# Patient Record
Sex: FEMALE | Race: WHITE | Employment: OTHER | URBAN - METROPOLITAN AREA
[De-identification: names, ages, dates, MRNs, and addresses within clinical notes are randomized per-mention and may not be internally consistent; named-entity substitution may affect disease eponyms.]

---

## 2020-10-26 ENCOUNTER — APPOINTMENT (OUTPATIENT)
Dept: CT IMAGING | Age: 65
DRG: 389 | End: 2020-10-26
Attending: EMERGENCY MEDICINE
Payer: MEDICARE

## 2020-10-26 ENCOUNTER — HOSPITAL ENCOUNTER (INPATIENT)
Age: 65
LOS: 2 days | Discharge: HOME | DRG: 389 | End: 2020-10-29
Attending: EMERGENCY MEDICINE | Admitting: FAMILY MEDICINE
Payer: MEDICARE

## 2020-10-26 DIAGNOSIS — R93.5 ABNORMAL CT OF THE ABDOMEN: ICD-10-CM

## 2020-10-26 DIAGNOSIS — I10 ESSENTIAL HYPERTENSION: ICD-10-CM

## 2020-10-26 DIAGNOSIS — K56.609 SBO (SMALL BOWEL OBSTRUCTION) (HCC): Primary | ICD-10-CM

## 2020-10-26 DIAGNOSIS — K91.89 AFFERENT LOOP SYNDROME: ICD-10-CM

## 2020-10-26 DIAGNOSIS — Z98.84 HISTORY OF ROUX-EN-Y GASTRIC BYPASS: ICD-10-CM

## 2020-10-26 DIAGNOSIS — E86.0 DEHYDRATION: ICD-10-CM

## 2020-10-26 LAB
ALBUMIN SERPL-MCNC: 3.7 G/DL (ref 3.2–4.6)
ALBUMIN/GLOB SERPL: 0.9 {RATIO} (ref 1.2–3.5)
ALP SERPL-CCNC: 232 U/L (ref 50–136)
ALT SERPL-CCNC: 27 U/L (ref 12–65)
ANION GAP SERPL CALC-SCNC: 10 MMOL/L (ref 7–16)
AST SERPL-CCNC: 72 U/L (ref 15–37)
BASOPHILS # BLD: 0.1 K/UL (ref 0–0.2)
BASOPHILS NFR BLD: 0 % (ref 0–2)
BILIRUB SERPL-MCNC: 1.3 MG/DL (ref 0.2–1.1)
BUN SERPL-MCNC: 22 MG/DL (ref 8–23)
CALCIUM SERPL-MCNC: 9.8 MG/DL (ref 8.3–10.4)
CHLORIDE SERPL-SCNC: 107 MMOL/L (ref 98–107)
CO2 SERPL-SCNC: 20 MMOL/L (ref 21–32)
CREAT SERPL-MCNC: 1.38 MG/DL (ref 0.6–1)
DIFFERENTIAL METHOD BLD: ABNORMAL
EOSINOPHIL # BLD: 0 K/UL (ref 0–0.8)
EOSINOPHIL NFR BLD: 0 % (ref 0.5–7.8)
ERYTHROCYTE [DISTWIDTH] IN BLOOD BY AUTOMATED COUNT: 13.3 % (ref 11.9–14.6)
GLOBULIN SER CALC-MCNC: 4.2 G/DL (ref 2.3–3.5)
GLUCOSE SERPL-MCNC: 114 MG/DL (ref 65–100)
HCT VFR BLD AUTO: 45.9 % (ref 35.8–46.3)
HGB BLD-MCNC: 15.9 G/DL (ref 11.7–15.4)
IMM GRANULOCYTES # BLD AUTO: 0.1 K/UL (ref 0–0.5)
IMM GRANULOCYTES NFR BLD AUTO: 1 % (ref 0–5)
LYMPHOCYTES # BLD: 2.6 K/UL (ref 0.5–4.6)
LYMPHOCYTES NFR BLD: 14 % (ref 13–44)
MCH RBC QN AUTO: 28.9 PG (ref 26.1–32.9)
MCHC RBC AUTO-ENTMCNC: 34.6 G/DL (ref 31.4–35)
MCV RBC AUTO: 83.5 FL (ref 79.6–97.8)
MONOCYTES # BLD: 1.3 K/UL (ref 0.1–1.3)
MONOCYTES NFR BLD: 7 % (ref 4–12)
NEUTS SEG # BLD: 14.2 K/UL (ref 1.7–8.2)
NEUTS SEG NFR BLD: 78 % (ref 43–78)
NRBC # BLD: 0 K/UL (ref 0–0.2)
PLATELET # BLD AUTO: 328 K/UL (ref 150–450)
PMV BLD AUTO: 8.7 FL (ref 9.4–12.3)
POTASSIUM SERPL-SCNC: 5.5 MMOL/L (ref 3.5–5.1)
PROT SERPL-MCNC: 7.9 G/DL (ref 6.3–8.2)
RBC # BLD AUTO: 5.5 M/UL (ref 4.05–5.2)
SODIUM SERPL-SCNC: 137 MMOL/L (ref 136–145)
WBC # BLD AUTO: 18.3 K/UL (ref 4.3–11.1)

## 2020-10-26 PROCEDURE — 74177 CT ABD & PELVIS W/CONTRAST: CPT

## 2020-10-26 PROCEDURE — 85025 COMPLETE CBC W/AUTO DIFF WBC: CPT

## 2020-10-26 PROCEDURE — 96374 THER/PROPH/DIAG INJ IV PUSH: CPT

## 2020-10-26 PROCEDURE — 99285 EMERGENCY DEPT VISIT HI MDM: CPT

## 2020-10-26 PROCEDURE — 74011250637 HC RX REV CODE- 250/637: Performed by: EMERGENCY MEDICINE

## 2020-10-26 PROCEDURE — 80053 COMPREHEN METABOLIC PANEL: CPT

## 2020-10-26 PROCEDURE — 81003 URINALYSIS AUTO W/O SCOPE: CPT

## 2020-10-26 PROCEDURE — 96361 HYDRATE IV INFUSION ADD-ON: CPT

## 2020-10-26 PROCEDURE — 74011250636 HC RX REV CODE- 250/636: Performed by: EMERGENCY MEDICINE

## 2020-10-26 PROCEDURE — 96375 TX/PRO/DX INJ NEW DRUG ADDON: CPT

## 2020-10-26 PROCEDURE — 74011000636 HC RX REV CODE- 636: Performed by: EMERGENCY MEDICINE

## 2020-10-26 RX ORDER — SODIUM CHLORIDE 0.9 % (FLUSH) 0.9 %
10 SYRINGE (ML) INJECTION
Status: COMPLETED | OUTPATIENT
Start: 2020-10-26 | End: 2020-10-27

## 2020-10-26 RX ORDER — HYDROMORPHONE HYDROCHLORIDE 1 MG/ML
1 INJECTION, SOLUTION INTRAMUSCULAR; INTRAVENOUS; SUBCUTANEOUS
Status: COMPLETED | OUTPATIENT
Start: 2020-10-26 | End: 2020-10-26

## 2020-10-26 RX ORDER — DIPHENOXYLATE HYDROCHLORIDE AND ATROPINE SULFATE 2.5; .025 MG/1; MG/1
2 TABLET ORAL
Status: COMPLETED | OUTPATIENT
Start: 2020-10-26 | End: 2020-10-26

## 2020-10-26 RX ORDER — ONDANSETRON 2 MG/ML
4 INJECTION INTRAMUSCULAR; INTRAVENOUS
Status: COMPLETED | OUTPATIENT
Start: 2020-10-26 | End: 2020-10-26

## 2020-10-26 RX ORDER — HYOSCYAMINE SULFATE 0.12 MG/1
0.25 TABLET SUBLINGUAL
Status: COMPLETED | OUTPATIENT
Start: 2020-10-26 | End: 2020-10-26

## 2020-10-26 RX ADMIN — DIATRIZOATE MEGLUMINE AND DIATRIZOATE SODIUM 15 ML: 660; 100 LIQUID ORAL; RECTAL at 23:13

## 2020-10-26 RX ADMIN — DIPHENOXYLATE HYDROCHLORIDE AND ATROPINE SULFATE 2 TABLET: 2.5; .025 TABLET ORAL at 21:59

## 2020-10-26 RX ADMIN — HYDROMORPHONE HYDROCHLORIDE 1 MG: 1 INJECTION, SOLUTION INTRAMUSCULAR; INTRAVENOUS; SUBCUTANEOUS at 21:59

## 2020-10-26 RX ADMIN — HYOSCYAMINE SULFATE 0.25 MG: 0.12 TABLET ORAL; SUBLINGUAL at 21:59

## 2020-10-26 RX ADMIN — ONDANSETRON 4 MG: 2 INJECTION INTRAMUSCULAR; INTRAVENOUS at 21:53

## 2020-10-26 RX ADMIN — SODIUM CHLORIDE 1000 ML: 900 INJECTION, SOLUTION INTRAVENOUS at 21:52

## 2020-10-26 NOTE — ED TRIAGE NOTES
Patient arrives via EMS from home for complaint of nausea, vomiting, diarrhea, and fatigue. Patient states symptoms began today.

## 2020-10-27 ENCOUNTER — APPOINTMENT (OUTPATIENT)
Dept: GENERAL RADIOLOGY | Age: 65
DRG: 389 | End: 2020-10-27
Attending: SURGERY
Payer: MEDICARE

## 2020-10-27 PROBLEM — F41.9 ANXIETY AND DEPRESSION: Status: ACTIVE | Noted: 2020-10-27

## 2020-10-27 PROBLEM — Z98.84 HISTORY OF GASTRIC BYPASS: Status: ACTIVE | Noted: 2020-10-27

## 2020-10-27 PROBLEM — K56.609 SBO (SMALL BOWEL OBSTRUCTION) (HCC): Status: ACTIVE | Noted: 2020-10-27

## 2020-10-27 PROBLEM — I50.32 CHRONIC DIASTOLIC CONGESTIVE HEART FAILURE (HCC): Status: ACTIVE | Noted: 2017-03-18

## 2020-10-27 PROBLEM — F32.A ANXIETY AND DEPRESSION: Status: ACTIVE | Noted: 2020-10-27

## 2020-10-27 LAB
ALBUMIN SERPL-MCNC: 2.9 G/DL (ref 3.2–4.6)
ALBUMIN/GLOB SERPL: 0.9 {RATIO} (ref 1.2–3.5)
ALP SERPL-CCNC: 171 U/L (ref 50–136)
ALT SERPL-CCNC: 25 U/L (ref 12–65)
ANION GAP SERPL CALC-SCNC: 9 MMOL/L (ref 7–16)
AST SERPL-CCNC: 38 U/L (ref 15–37)
BASOPHILS # BLD: 0.1 K/UL (ref 0–0.2)
BASOPHILS NFR BLD: 1 % (ref 0–2)
BILIRUB SERPL-MCNC: 0.5 MG/DL (ref 0.2–1.1)
BUN SERPL-MCNC: 21 MG/DL (ref 8–23)
CALCIUM SERPL-MCNC: 8.5 MG/DL (ref 8.3–10.4)
CHLORIDE SERPL-SCNC: 113 MMOL/L (ref 98–107)
CO2 SERPL-SCNC: 22 MMOL/L (ref 21–32)
CREAT SERPL-MCNC: 1.08 MG/DL (ref 0.6–1)
DIFFERENTIAL METHOD BLD: NORMAL
EOSINOPHIL # BLD: 0.1 K/UL (ref 0–0.8)
EOSINOPHIL NFR BLD: 1 % (ref 0.5–7.8)
ERYTHROCYTE [DISTWIDTH] IN BLOOD BY AUTOMATED COUNT: 13.8 % (ref 11.9–14.6)
GLOBULIN SER CALC-MCNC: 3.1 G/DL (ref 2.3–3.5)
GLUCOSE SERPL-MCNC: 85 MG/DL (ref 65–100)
HCT VFR BLD AUTO: 37.1 % (ref 35.8–46.3)
HGB BLD-MCNC: 12.7 G/DL (ref 11.7–15.4)
IMM GRANULOCYTES # BLD AUTO: 0.1 K/UL (ref 0–0.5)
IMM GRANULOCYTES NFR BLD AUTO: 1 % (ref 0–5)
LYMPHOCYTES # BLD: 2.4 K/UL (ref 0.5–4.6)
LYMPHOCYTES NFR BLD: 27 % (ref 13–44)
MCH RBC QN AUTO: 29.4 PG (ref 26.1–32.9)
MCHC RBC AUTO-ENTMCNC: 34.2 G/DL (ref 31.4–35)
MCV RBC AUTO: 85.9 FL (ref 79.6–97.8)
MONOCYTES # BLD: 0.8 K/UL (ref 0.1–1.3)
MONOCYTES NFR BLD: 9 % (ref 4–12)
NEUTS SEG # BLD: 5.4 K/UL (ref 1.7–8.2)
NEUTS SEG NFR BLD: 61 % (ref 43–78)
NRBC # BLD: 0 K/UL (ref 0–0.2)
PLATELET # BLD AUTO: 192 K/UL (ref 150–450)
PLATELET COMMENTS,PCOM: ADEQUATE
PMV BLD AUTO: 9.5 FL (ref 9.4–12.3)
POTASSIUM SERPL-SCNC: 3.2 MMOL/L (ref 3.5–5.1)
PROT SERPL-MCNC: 6 G/DL (ref 6.3–8.2)
RBC # BLD AUTO: 4.32 M/UL (ref 4.05–5.2)
RBC MORPH BLD: NORMAL
SODIUM SERPL-SCNC: 144 MMOL/L (ref 138–145)
WBC # BLD AUTO: 8.9 K/UL (ref 4.3–11.1)
WBC MORPH BLD: NORMAL

## 2020-10-27 PROCEDURE — 99222 1ST HOSP IP/OBS MODERATE 55: CPT | Performed by: SURGERY

## 2020-10-27 PROCEDURE — 74018 RADEX ABDOMEN 1 VIEW: CPT

## 2020-10-27 PROCEDURE — 74011000636 HC RX REV CODE- 636: Performed by: EMERGENCY MEDICINE

## 2020-10-27 PROCEDURE — 74011250637 HC RX REV CODE- 250/637: Performed by: FAMILY MEDICINE

## 2020-10-27 PROCEDURE — 65270000029 HC RM PRIVATE

## 2020-10-27 PROCEDURE — 74011000258 HC RX REV CODE- 258: Performed by: EMERGENCY MEDICINE

## 2020-10-27 PROCEDURE — 74011250637 HC RX REV CODE- 250/637: Performed by: HOSPITALIST

## 2020-10-27 PROCEDURE — 74011250636 HC RX REV CODE- 250/636: Performed by: HOSPITALIST

## 2020-10-27 PROCEDURE — 74011000258 HC RX REV CODE- 258: Performed by: FAMILY MEDICINE

## 2020-10-27 PROCEDURE — 85025 COMPLETE CBC W/AUTO DIFF WBC: CPT

## 2020-10-27 PROCEDURE — 74011250636 HC RX REV CODE- 250/636: Performed by: EMERGENCY MEDICINE

## 2020-10-27 PROCEDURE — 36415 COLL VENOUS BLD VENIPUNCTURE: CPT

## 2020-10-27 PROCEDURE — 74011250637 HC RX REV CODE- 250/637: Performed by: SURGERY

## 2020-10-27 PROCEDURE — 74011000250 HC RX REV CODE- 250: Performed by: EMERGENCY MEDICINE

## 2020-10-27 PROCEDURE — 96375 TX/PRO/DX INJ NEW DRUG ADDON: CPT

## 2020-10-27 PROCEDURE — 74011250636 HC RX REV CODE- 250/636: Performed by: FAMILY MEDICINE

## 2020-10-27 PROCEDURE — 2709999900 HC NON-CHARGEABLE SUPPLY

## 2020-10-27 PROCEDURE — 80053 COMPREHEN METABOLIC PANEL: CPT

## 2020-10-27 RX ORDER — DULOXETIN HYDROCHLORIDE 60 MG/1
60 CAPSULE, DELAYED RELEASE ORAL 2 TIMES DAILY
COMMUNITY
Start: 2020-07-07 | End: 2021-07-07

## 2020-10-27 RX ORDER — OXYCODONE HYDROCHLORIDE 5 MG/1
5 TABLET ORAL
COMMUNITY
Start: 2020-09-15 | End: 2021-09-15

## 2020-10-27 RX ORDER — HYDROXYZINE 25 MG/1
50 TABLET, FILM COATED ORAL
COMMUNITY
Start: 2020-09-23

## 2020-10-27 RX ORDER — OMEPRAZOLE 20 MG/1
20 CAPSULE, DELAYED RELEASE ORAL 2 TIMES DAILY
COMMUNITY
Start: 2020-02-27 | End: 2021-02-26

## 2020-10-27 RX ORDER — POLYETHYLENE GLYCOL 3350 17 G/17G
17 POWDER, FOR SOLUTION ORAL DAILY PRN
Status: DISCONTINUED | OUTPATIENT
Start: 2020-10-27 | End: 2020-10-27

## 2020-10-27 RX ORDER — SODIUM CHLORIDE 9 MG/ML
75 INJECTION, SOLUTION INTRAVENOUS CONTINUOUS
Status: DISCONTINUED | OUTPATIENT
Start: 2020-10-27 | End: 2020-10-28

## 2020-10-27 RX ORDER — FACIAL-BODY WIPES
10 EACH TOPICAL DAILY
Status: DISCONTINUED | OUTPATIENT
Start: 2020-10-27 | End: 2020-10-29 | Stop reason: HOSPADM

## 2020-10-27 RX ORDER — DICYCLOMINE HYDROCHLORIDE 20 MG/1
20 TABLET ORAL
COMMUNITY
Start: 2020-09-22 | End: 2020-12-21

## 2020-10-27 RX ORDER — COLESTIPOL HYDROCHLORIDE 5 G/5G
5 GRANULE, FOR SUSPENSION ORAL
COMMUNITY

## 2020-10-27 RX ORDER — LEVOTHYROXINE SODIUM 125 UG/1
125 TABLET ORAL DAILY
COMMUNITY
Start: 2020-05-19

## 2020-10-27 RX ORDER — HYDROMORPHONE HYDROCHLORIDE 1 MG/ML
0.2 INJECTION, SOLUTION INTRAMUSCULAR; INTRAVENOUS; SUBCUTANEOUS ONCE
Status: COMPLETED | OUTPATIENT
Start: 2020-10-27 | End: 2020-10-27

## 2020-10-27 RX ORDER — ENOXAPARIN SODIUM 100 MG/ML
40 INJECTION SUBCUTANEOUS DAILY
Status: DISCONTINUED | OUTPATIENT
Start: 2020-10-27 | End: 2020-10-29 | Stop reason: HOSPADM

## 2020-10-27 RX ORDER — LACOSAMIDE 200 MG/1
200 TABLET ORAL 2 TIMES DAILY
COMMUNITY
Start: 2020-09-23

## 2020-10-27 RX ORDER — ACETAMINOPHEN 325 MG/1
650 TABLET ORAL
Status: DISCONTINUED | OUTPATIENT
Start: 2020-10-27 | End: 2020-10-29 | Stop reason: HOSPADM

## 2020-10-27 RX ORDER — MESALAMINE 1.2 G/1
4.8 TABLET, DELAYED RELEASE ORAL DAILY
COMMUNITY
Start: 2020-09-28 | End: 2021-09-28

## 2020-10-27 RX ORDER — OXYMETAZOLINE HCL 0.05 %
2 SPRAY, NON-AEROSOL (ML) NASAL
Status: DISCONTINUED | OUTPATIENT
Start: 2020-10-27 | End: 2020-10-27

## 2020-10-27 RX ORDER — POTASSIUM CHLORIDE 14.9 MG/ML
20 INJECTION INTRAVENOUS
Status: COMPLETED | OUTPATIENT
Start: 2020-10-27 | End: 2020-10-27

## 2020-10-27 RX ORDER — ONDANSETRON 8 MG/1
8 TABLET, ORALLY DISINTEGRATING ORAL
COMMUNITY
Start: 2020-09-22 | End: 2020-12-21

## 2020-10-27 RX ORDER — TRIHEXYPHENIDYL HYDROCHLORIDE 2 MG/1
2 TABLET ORAL 3 TIMES DAILY
COMMUNITY
Start: 2020-10-16

## 2020-10-27 RX ORDER — CLOPIDOGREL BISULFATE 75 MG/1
75 TABLET ORAL DAILY
COMMUNITY
Start: 2020-06-19

## 2020-10-27 RX ORDER — ACETAMINOPHEN 650 MG/1
650 SUPPOSITORY RECTAL
Status: DISCONTINUED | OUTPATIENT
Start: 2020-10-27 | End: 2020-10-29 | Stop reason: HOSPADM

## 2020-10-27 RX ORDER — PREGABALIN 100 MG/1
100 CAPSULE ORAL 2 TIMES DAILY
COMMUNITY
Start: 2020-10-16

## 2020-10-27 RX ORDER — HYDROCODONE BITARTRATE AND ACETAMINOPHEN 5; 325 MG/1; MG/1
1 TABLET ORAL
Status: DISCONTINUED | OUTPATIENT
Start: 2020-10-27 | End: 2020-10-29 | Stop reason: HOSPADM

## 2020-10-27 RX ORDER — CARVEDILOL 3.12 MG/1
3.12 TABLET ORAL 2 TIMES DAILY
COMMUNITY
Start: 2020-10-10

## 2020-10-27 RX ORDER — ONDANSETRON 2 MG/ML
4 INJECTION INTRAMUSCULAR; INTRAVENOUS
Status: DISCONTINUED | OUTPATIENT
Start: 2020-10-27 | End: 2020-10-29 | Stop reason: HOSPADM

## 2020-10-27 RX ORDER — MORPHINE SULFATE 2 MG/ML
1 INJECTION, SOLUTION INTRAMUSCULAR; INTRAVENOUS
Status: DISCONTINUED | OUTPATIENT
Start: 2020-10-27 | End: 2020-10-28

## 2020-10-27 RX ORDER — ATORVASTATIN CALCIUM 80 MG/1
80 TABLET, FILM COATED ORAL
COMMUNITY
Start: 2020-05-13

## 2020-10-27 RX ORDER — ACETAMINOPHEN 500 MG
1 TABLET ORAL DAILY
COMMUNITY
Start: 2020-10-22

## 2020-10-27 RX ORDER — SODIUM CHLORIDE 0.9 % (FLUSH) 0.9 %
5-40 SYRINGE (ML) INJECTION AS NEEDED
Status: DISCONTINUED | OUTPATIENT
Start: 2020-10-27 | End: 2020-10-29 | Stop reason: HOSPADM

## 2020-10-27 RX ORDER — SODIUM CHLORIDE 0.9 % (FLUSH) 0.9 %
5-40 SYRINGE (ML) INJECTION EVERY 8 HOURS
Status: DISCONTINUED | OUTPATIENT
Start: 2020-10-27 | End: 2020-10-29 | Stop reason: HOSPADM

## 2020-10-27 RX ORDER — ZONISAMIDE 100 MG/1
100 CAPSULE ORAL 2 TIMES DAILY
COMMUNITY
Start: 2020-06-26

## 2020-10-27 RX ORDER — DICLOFENAC SODIUM 10 MG/G
1-2 GEL TOPICAL
COMMUNITY

## 2020-10-27 RX ORDER — TORSEMIDE 10 MG/1
10 TABLET ORAL 3 TIMES DAILY
Status: ON HOLD | COMMUNITY
Start: 2020-10-22 | End: 2020-10-29 | Stop reason: SDUPTHER

## 2020-10-27 RX ORDER — LEVOCETIRIZINE DIHYDROCHLORIDE 5 MG/1
2.5 TABLET, FILM COATED ORAL
COMMUNITY
Start: 2020-04-22 | End: 2021-04-17

## 2020-10-27 RX ORDER — POTASSIUM CHLORIDE 20 MEQ/1
20 TABLET, EXTENDED RELEASE ORAL EVERY OTHER DAY
COMMUNITY
Start: 2020-10-22

## 2020-10-27 RX ORDER — PROMETHAZINE HYDROCHLORIDE 25 MG/1
12.5 TABLET ORAL
Status: DISCONTINUED | OUTPATIENT
Start: 2020-10-27 | End: 2020-10-29 | Stop reason: HOSPADM

## 2020-10-27 RX ORDER — CHOLECALCIFEROL TAB 125 MCG (5000 UNIT) 125 MCG
5000 TAB ORAL EVERY MORNING
COMMUNITY

## 2020-10-27 RX ADMIN — IOPAMIDOL 100 ML: 755 INJECTION, SOLUTION INTRAVENOUS at 00:20

## 2020-10-27 RX ADMIN — Medication 1 EACH: at 20:36

## 2020-10-27 RX ADMIN — HYDROCODONE BITARTRATE AND ACETAMINOPHEN 1 TABLET: 5; 325 TABLET ORAL at 16:36

## 2020-10-27 RX ADMIN — SODIUM CHLORIDE 100 ML: 900 INJECTION, SOLUTION INTRAVENOUS at 00:20

## 2020-10-27 RX ADMIN — Medication 10 ML: at 05:00

## 2020-10-27 RX ADMIN — Medication 10 ML: at 13:57

## 2020-10-27 RX ADMIN — SODIUM CHLORIDE 75 ML/HR: 900 INJECTION, SOLUTION INTRAVENOUS at 19:45

## 2020-10-27 RX ADMIN — BISACODYL 10 MG: 10 SUPPOSITORY RECTAL at 09:25

## 2020-10-27 RX ADMIN — HYDROMORPHONE HYDROCHLORIDE 0.2 MG: 1 INJECTION, SOLUTION INTRAMUSCULAR; INTRAVENOUS; SUBCUTANEOUS at 04:59

## 2020-10-27 RX ADMIN — HYDROCODONE BITARTRATE AND ACETAMINOPHEN 1 TABLET: 5; 325 TABLET ORAL at 22:56

## 2020-10-27 RX ADMIN — SODIUM CHLORIDE 75 ML/HR: 900 INJECTION, SOLUTION INTRAVENOUS at 06:39

## 2020-10-27 RX ADMIN — ENOXAPARIN SODIUM 40 MG: 40 INJECTION SUBCUTANEOUS at 09:25

## 2020-10-27 RX ADMIN — PIPERACILLIN SODIUM AND TAZOBACTAM SODIUM 3.38 G: 3; .375 INJECTION, POWDER, LYOPHILIZED, FOR SOLUTION INTRAVENOUS at 19:45

## 2020-10-27 RX ADMIN — ONDANSETRON 4 MG: 2 INJECTION INTRAMUSCULAR; INTRAVENOUS at 20:36

## 2020-10-27 RX ADMIN — PIPERACILLIN SODIUM AND TAZOBACTAM SODIUM 3.38 G: 3; .375 INJECTION, POWDER, LYOPHILIZED, FOR SOLUTION INTRAVENOUS at 11:33

## 2020-10-27 RX ADMIN — ONDANSETRON 4 MG: 2 INJECTION INTRAMUSCULAR; INTRAVENOUS at 13:53

## 2020-10-27 RX ADMIN — Medication 10 ML: at 00:20

## 2020-10-27 RX ADMIN — POTASSIUM CHLORIDE 20 MEQ: 200 INJECTION, SOLUTION INTRAVENOUS at 13:49

## 2020-10-27 RX ADMIN — Medication 10 ML: at 21:32

## 2020-10-27 RX ADMIN — PIPERACILLIN SODIUM AND TAZOBACTAM SODIUM 4.5 G: 4; .5 INJECTION, POWDER, LYOPHILIZED, FOR SOLUTION INTRAVENOUS at 03:32

## 2020-10-27 RX ADMIN — POTASSIUM CHLORIDE 20 MEQ: 200 INJECTION, SOLUTION INTRAVENOUS at 11:31

## 2020-10-27 RX ADMIN — THIAMINE HYDROCHLORIDE: 100 INJECTION, SOLUTION INTRAMUSCULAR; INTRAVENOUS at 02:10

## 2020-10-27 NOTE — CONSULTS
H&P/Consult Note/Progress Note/Office Note:   Jann Christiansen  MRN: [id number]  :1955  Age:65 y.o.    HPI: Jann Christiansen is a 72 y.o. female who we are asked by Dr. Magali Pena to see for SBO seen on CT. The patient has a history or RnY and presented to the ER with complaints of abdominal pain, nausea, and vomiting. She had a previous SBO about 2 years ago. ER workup showed SBO possibly involving the afferent loop of the RnY. On exam, she reports nausea. Denies flatus but feels gas moving. Her abdomen is soft and she reports improved tenderness. Follow up KUB shows contrast in the colon. She is AF with normal WBC. K+ 3.2. No past medical history on file. No past surgical history on file.   Current Facility-Administered Medications   Medication Dose Route Frequency    sodium chloride (NS) flush 5-40 mL  5-40 mL IntraVENous Q8H    sodium chloride (NS) flush 5-40 mL  5-40 mL IntraVENous PRN    acetaminophen (TYLENOL) tablet 650 mg  650 mg Oral Q6H PRN    Or    acetaminophen (TYLENOL) suppository 650 mg  650 mg Rectal Q6H PRN    promethazine (PHENERGAN) tablet 12.5 mg  12.5 mg Oral Q6H PRN    Or    ondansetron (ZOFRAN) injection 4 mg  4 mg IntraVENous Q6H PRN    enoxaparin (LOVENOX) injection 40 mg  40 mg SubCUTAneous DAILY    0.9% sodium chloride infusion  75 mL/hr IntraVENous CONTINUOUS    piperacillin-tazobactam (ZOSYN) 3.375 g in 0.9% sodium chloride (MBP/ADV) 100 mL  3.375 g IntraVENous Q8H    lip protectant (BLISTEX) ointment 1 Each  1 Each Topical PRN    bisacodyL (DULCOLAX) suppository 10 mg  10 mg Rectal DAILY    potassium chloride 20 mEq in 100 ml IVPB  20 mEq IntraVENous Q2H     Latex  Social History     Socioeconomic History    Marital status:      Spouse name: Not on file    Number of children: Not on file    Years of education: Not on file    Highest education level: Not on file     Social History     Tobacco Use   Smoking Status Not on file     No family history on file. ROS: The patient has no difficulty with chest pain or shortness of breath. No fever or chills. Comprehensive review of systems was otherwise unremarkable except as noted above. Physical Exam:   Visit Vitals  /66   Pulse 74   Temp 98.7 °F (37.1 °C)   Resp 16   Ht 5' 4\" (1.626 m)   Wt 152 lb 11.2 oz (69.3 kg)   SpO2 100%   BMI 26.21 kg/m²     Constitutional: Alert, oriented, cooperative patient in no acute distress; appears stated age    Eyes:Sclera are clear. EOMs intact  ENMT: no external lesions gross hearing normal; no obvious neck masses, no ear or lip lesions, nares normal  CV: RRR. Normal perfusion  Resp: No JVD. Breathing is  non-labored; no audible wheezing. GI: soft and non-distended, minimally tender in the right upper abdomen   Musculoskeletal: unremarkable with normal function. No embolic signs or cyanosis.    Neuro:  Oriented; moves all 4; no focal deficits  Psychiatric: normal affect and mood, no memory impairment    Recent vitals (if inpt):  Patient Vitals for the past 24 hrs:   BP Temp Pulse Resp SpO2 Height Weight   10/27/20 0856 106/66 98.7 °F (37.1 °C) 74 16 100 %     10/27/20 0428 117/87 98.6 °F (37 °C) 80 16 100 %  152 lb 11.2 oz (69.3 kg)   10/27/20 0328 (!) 94/57    98 %     10/26/20 2359 106/61    99 %     10/26/20 2328 113/68    100 %     10/26/20 2259 102/63    99 %     10/26/20 2228 124/71    98 %     10/26/20 2210      5' 4\" (1.626 m) 110 lb (49.9 kg)   10/26/20 2159     100 %     10/26/20 2158 132/85         10/26/20 1903 107/68 99.7 °F (37.6 °C) (!) 116 20 97 %         Labs:  Recent Labs     10/27/20  0920   WBC 8.9   HGB 12.7         K 3.2*   *   CO2 22   BUN 21   CREA 1.08*   GLU 85   TBILI 0.5   ALT 25   *       Lab Results   Component Value Date/Time    WBC 8.9 10/27/2020 09:20 AM    HGB 12.7 10/27/2020 09:20 AM    PLATELET 110 75/59/3773 09:20 AM    Sodium 144 10/27/2020 09:20 AM Potassium 3.2 (L) 10/27/2020 09:20 AM    Chloride 113 (H) 10/27/2020 09:20 AM    CO2 22 10/27/2020 09:20 AM    BUN 21 10/27/2020 09:20 AM    Creatinine 1.08 (H) 10/27/2020 09:20 AM    Glucose 85 10/27/2020 09:20 AM    Bilirubin, total 0.5 10/27/2020 09:20 AM    ALT (SGPT) 25 10/27/2020 09:20 AM    Alk. phosphatase 171 (H) 10/27/2020 09:20 AM       I reviewed recent labs and recent radiologic studies. CT Results (most recent):  Results from Hospital Encounter encounter on 10/26/20   CT ABD PELV W CONT    Narrative EXAM: CT abdomen and pelvis with IV contrast.    INDICATION: Abdominal pain and vomiting. COMPARISON: None. TECHNIQUE: Axial CT images of the abdomen and pelvis were obtained after the  intravenous injection of 100 mL Isovue 370 CT contrast. Oral contrast was  administered as well. Radiation dose reduction techniques were used for this  study. Our CT scanners use one or all of the following:  Automated exposure  control, adjustment of the mA or kV according to patient size, iterative  reconstruction. FINDINGS:    - Liver: Within normal limits. - Gallbladder and bile ducts: Postcholecystectomy. - Spleen: Within normal limits. - Urinary tract: Nonobstructing 7 mm right kidney stone. The urinary bladder and  left kidney are unremarkable. - Adrenals: Within normal limits. - Pancreas: Within normal limits. - Gastrointestinal tract: There is altered bowel anatomy with multiple  postsurgical clips in the upper abdomen. There is a distended small bowel loop  in the right upper quadrant extending towards the liver hilum, measuring 5 cm in  diameter and containing oral contrast, possibly an obstructed afferent loop of a  Eden-en-Y. The stomach and more distal small bowel loops are normal in caliber,  as is the colon. The cause of the dilatation is not clearly identified. No  discrete bowel mass or wall thickening is seen. - Retroperitoneum: Mild abdominal aortic atherosclerosis.   - Peritoneal cavity and abdominal wall: No ascites or free intraperitoneal air.  - Pelvis: Post hysterectomy. - Spine/bones: No acute process. There is fusion hardware at the lumbosacral  junction.  - Other comments: None. Impression IMPRESSION: There is a dilated bowel loop in the right upper quadrant,  containing oral contrast, which extends towards the liver hilum, possibly an  obstructed afferent loop of a Eden-en-Y.       XR Results (most recent):  Results from Hospital Encounter encounter on 10/26/20   XR ABD (KUB)    Narrative KUB 10/27/2020    CLINICAL HISTORY: Dilated afferent loop. COMPARISON: CT abdomen 10/27/2020    FINDINGS:  Postsurgical changes are seen with multiple suture lines and staples in the left  and right upper abdomen. Density is seen in the central bilateral abdomen which  appears to represent excreted contrast within the renal collecting systems from  the patient's recent CT scan. Previously administered oral contrast is now seen  within the right and proximal to mid transverse colon. No clearly demonstrated  retained contrast within the proximal small bowel loop is seen. Assessment for  free air is limited although no obvious free air is seen. The patient is status  post single level fusion in the lower lumbar spine. Impression IMPRESSION:  1. Nonspecific bowel gas pattern. Visualized oral contrast now resides within  the right and proximal to mid transverse colon. No significant retention within  the dilated proximal small bowel loop is seen. I independently reviewed radiology images for studies I described above or studies I have ordered.    Admission date (for inpatients): 10/26/2020   * No surgery found *  * No surgery found *    ASSESSMENT/PLAN:  Problem List  Never Reviewed          Codes Class Noted    * (Principal) SBO ([facility] obstruction) ([facility] ICD-10-CM: H18.973  ICD-9-CM: 560.9  10/27/2020        Anxiety and depression ICD-10-CM: F41.9, F32.9  ICD-9-CM: 300.00, 311 10/27/2020        History of gastric bypass ICD-10-CM: Z98.84  ICD-9-CM: V45.86  10/27/2020    Overview Signed 10/27/2020  3:19 AM by Jensen Estevez MD     Eden-en- y             Chronic diastolic congestive heart failure ([facility] ICD-10-CM: I50.32  ICD-9-CM: 428.32, 428.0  3/18/2017    Overview Signed 10/27/2020  3:19 AM by Jensen Estevez MD     Echo 2015: EF 60-65%, G2 LVDD, no significant valvular disease    Last Assessment & Plan:   Patient with recent increase in BLE edema prompting evaluation by PCP. She had 7# weight loss and complete resolution of edema on 20mg Torsemide x 1 week. Started on every other day dosing and has noted slow increase in weight and BLE edema. She also notes some dizziness which has been unchanged since last visit. Had noted a low BP (80s/60s) when working with home PT, but has never had that low of pressure in the office. Lab work recently collected and unremarkable. - Continue Coreg  - Increase Torsemide to alternate 20mg and 10mg every other day  - Requested patient to take BP 3 x weekly   - F/U in 6 weeks             Essential hypertension ICD-10-CM: I10  ICD-9-CM: 401.9  8/12/2015    Overview Signed 10/27/2020  3:19 AM by Jensen Estevez MD     Last Assessment & Plan:   D/w pt and there are multiple medications that could be contributing to her feeling lightheaded or dizzy. Her BP is on the lower side of normal and will go ahead and decrease her diuretic per the cariology note dated 10/7. She agrees to the following regimen:  1. Decrease Torsemide (from 20 mg every other day) to 10 mg every other day (decrease KCl 20 mEq [from daily] to every other day). May take an additional dose of 10 mg on one day of the week for increased peripheral edema or wt gain.    2. Continue Coreg 3.125 mg BID             Acquired hypothyroidism ICD-10-CM: E03.9  ICD-9-CM: 244.9  8/11/2015    Overview Signed 10/27/2020  3:19 AM by Jensen Estevez MD     Labile hypothyroidism due to absorption issues s/p gastric bypass. Patient reported adverse reaction to Synthroid. Stable on Levoxyl. Last Assessment & Plan:   Labile hypothyroidism due to absorption issues s/p gastric bypass. Currently stable on Levoxyl 137 mcg. Repeat TSH and free T4 pending. Principal Problem:    SBO (small bowel obstruction) ([facility] (10/27/2020)    Active Problems:    Acquired hypothyroidism (8/11/2015)      Overview: Labile hypothyroidism due to absorption issues s/p gastric bypass. Patient       reported adverse reaction to Synthroid. Stable on Levoxyl. Last Assessment & Plan:       Labile hypothyroidism due to absorption issues s/p gastric bypass. Currently stable on Levoxyl 137 mcg. Repeat TSH and free T4 pending. Anxiety and depression (10/27/2020)      Chronic diastolic congestive heart failure ([facility] (3/18/2017)      Overview: Echo 2015: EF 60-65%, G2 LVDD, no significant valvular disease            Last Assessment & Plan:       Patient with recent increase in BLE edema prompting evaluation by PCP. She       had 7# weight loss and complete resolution of edema on 20mg Torsemide x 1       week. Started on every other day dosing and has noted slow increase in       weight and BLE edema. She also notes some dizziness which has been       unchanged since last visit. Had noted a low BP (80s/60s) when working with       home PT, but has never had that low of pressure in the office. Lab work       recently collected and unremarkable. - Continue Coreg      - Increase Torsemide to alternate 20mg and 10mg every other day      - Requested patient to take BP 3 x weekly       - F/U in 6 weeks      Essential hypertension (8/12/2015)      Overview: Last Assessment & Plan:       D/w pt and there are multiple medications that could be contributing to       her feeling lightheaded or dizzy.  Her BP is on the lower side of normal       and will go ahead and decrease her diuretic per the cariology note dated       10/7. She agrees to the following regimen:      1. Decrease Torsemide (from 20 mg every other day) to 10 mg every other       day (decrease KCl 20 mEq [from daily] to every other day). May take an       additional dose of 10 mg on one day of the week for increased peripheral       edema or wt gain.        2. Continue Coreg 3.125 mg BID      History of gastric bypass (10/27/2020)      Overview: Eden-en- y       Care management per primary team  Clears adv as tolerates  IVFs  Monitor labs, replace lytes PRN  Serial exams  Given contrast in colon, patient not fully obstructed  Will continue to monitor     Signed:  Malinda Stewart NP

## 2020-10-27 NOTE — ROUTINE PROCESS
Bedside and Verbal shift change report given to self (oncoming nurse) by Duncan Swann RN (offgoing nurse). Report included the following information SBAR, Kardex, ED Summary, Procedure Summary, Intake/Output, MAR, Recent Results

## 2020-10-27 NOTE — PROGRESS NOTES
Today is day 0 of hospitalization. I saw and evaluated the patient at bedside. Patient admitted for small bowel obstruction. General surgery was consulted. Continue hydration with IV fluids. Patient has oral contrast in the right and proximal to mid transverse colon. No significant retention within the dilated proximal small bowel loop. Patient not fully obstructed. She was started on clear liquid diet today. Advance diet as tolerated.

## 2020-10-27 NOTE — H&P
HOSPITALIST H&P/CONSULT  NAME:  Zahra Delgado   Age:  72 y.o.  :   1955   MRN:   [id number]  PCP: Peyton Ramos MD  Consulting MD:  Treatment Team: Attending Provider: Shadi Kelly MD; Primary Nurse: Gisselle Jain. HPI:   Patient is a 69yoF with PMH significant for gastric bypass surgery, HLD, HTN who presents with nausea, vomiting, and diarrhea. Patient reports that this started yesterday. She states that she has tried zofran, but it did not help. She also stated that she began having abdominal pain around the onset of her N/V/D. She states that she still feels nauseated, but has not had any diarrhea since arriving to the hospital.  On ER workup, patient was found to have evidence of a SBO on CT, possibly involving the afferent loop of Eden-en-Y. General Surgery consulted. Recommended Hospitalist admission. Complete ROS done and is as stated in HPI or otherwise negative. Past Medical History reviewed. Had SBO about 2 years ago. Past Surgical History includes Eden-en-Y gastric bypass. Prior to Admission Medications   Prescriptions Last Dose Informant Patient Reported? Taking? Blood Pressure Monitor kit   Yes Yes   Si Each by Not Applicable route daily. DULoxetine (CYMBALTA) 60 mg capsule   Yes Yes   Sig: Take 60 mg by mouth two (2) times a day. alteplase (CATHFLO) 2 mg injection   Yes No   Si mg by InterCATHeter route as needed. atorvastatin (LIPITOR) 80 mg tablet   Yes Yes   Sig: Take 80 mg by mouth. carvediloL (COREG) 3.125 mg tablet   Yes Yes   Sig: Take 3.125 mg by mouth two (2) times a day. cholecalciferol (VITAMIN D3) (5000 Units/125 mcg) tab tablet   Yes No   Sig: Take 5,000 Units by mouth Every morning. clopidogreL (PLAVIX) 75 mg tab   Yes Yes   Sig: Take 75 mg by mouth daily. colestipoL (COLESTID) 5 gram packet   Yes No   Sig: Take 5 g by mouth three (3) times daily as needed.    deutetrabenazine 6 mg tab   Yes Yes   Sig: Take 2 Tabs by mouth Every morning. deutetrabenazine 9 mg tab   Yes Yes   Sig: Take 2 Tabs by mouth. diclofenac (VOLTAREN) 1 % gel   Yes No   Sig: Apply 1-2 g to affected area. dicyclomine (BENTYL) 20 mg tablet   Yes Yes   Sig: Take 20 mg by mouth four (4) times daily as needed. hydrOXYzine HCL (ATARAX) 25 mg tablet   Yes Yes   Sig: Take 50 mg by mouth three (3) times daily as needed. lacosamide (VIMPAT) 200 mg tab tablet   Yes Yes   Sig: Take 200 mg by mouth two (2) times a day. levocetirizine (XYZAL) 5 mg tablet   Yes Yes   Sig: Take 2.5 mg by mouth nightly. levothyroxine (SYNTHROID) 125 mcg tablet   Yes Yes   Sig: Take 125 mcg by mouth daily. lifitegrast 5 % dpet   Yes No   Sig: Administer 1 Drop to both eyes two (2) times a day. mesalamine (LIALDA) 1.2 gram delayed release tablet   Yes Yes   Sig: Take 4.8 g by mouth daily. omeprazole (PRILOSEC) 20 mg capsule   Yes Yes   Sig: Take 20 mg by mouth two (2) times a day. ondansetron (ZOFRAN ODT) 8 mg disintegrating tablet   Yes Yes   Sig: Take 8 mg by mouth three (3) times daily as needed. oxyCODONE IR (ROXICODONE) 5 mg immediate release tablet   Yes Yes   Sig: Take 5 mg by mouth every six (6) hours as needed. potassium chloride (K-DUR, KLOR-CON) 20 mEq tablet   Yes Yes   Sig: Take 20 mEq by mouth every other day. pregabalin (LYRICA) 100 mg capsule   Yes Yes   Sig: Take 100 mg by mouth two (2) times a day. torsemide (DEMADEX) 10 mg tablet   Yes Yes   Sig: Take 10 mg by mouth three (3) times daily. trihexyphenidyL (ARTANE) 2 mg tablet   Yes Yes   Sig: Take 2 mg by mouth three (3) times daily. zonisamide (ZONEGRAN) 100 mg capsule   Yes Yes   Sig: Take 2 mg by mouth two (2) times a day.  100mg Capsule in the AM 300mg in the PM      Facility-Administered Medications: None     Allergies   Allergen Reactions    Latex Rash      Social History     Tobacco Use    Smoking status: Not on file   Substance Use Topics    Alcohol use: Not on file      No family history on file. Family History reviewed and is non-contributory to current presentation. Objective:     Visit Vitals  /61   Pulse (!) 116   Temp 99.7 °F (37.6 °C)   Resp 20   Ht 5' 4\" (1.626 m)   Wt 49.9 kg (110 lb)   SpO2 99%   BMI 18.88 kg/m²      Temp (24hrs), Av.7 °F (37.6 °C), Min:99.7 °F (37.6 °C), Max:99.7 °F (37.6 °C)    Oxygen Therapy  O2 Sat (%): 99 % (10/26/20 2359)  Pulse via Oximetry: 94 beats per minute (10/26/20 2359)  O2 Device: Room air (10/26/20 1903)  Physical Exam:  General:    Alert, cooperative, no distress, appears stated age. Head:   Normocephalic, without obvious abnormality, atraumatic. Nose:  Nares normal. No drainage or sinus tenderness. Lungs:   Clear to auscultation bilaterally. No Wheezing or Rhonchi. No rales. Heart:   Regular rate and rhythm, no murmur, rub or gallop. Abdomen:   Soft. Not distended. Quiet bowel sounds. TTP in epigastrium. Extremities: No cyanosis. No edema. No clubbing. Skin:     Texture, turgor normal.  Not Jaundiced. Neurologic: Alert and oriented x 3, no focal deficits. Data Review:   Recent Results (from the past 24 hour(s))   CBC WITH AUTOMATED DIFF    Collection Time: 10/26/20  7:18 PM   Result Value Ref Range    WBC 18.3 (H) 4.3 - 11.1 K/uL    RBC 5.50 (H) 4.05 - 5.2 M/uL    HGB 15.9 (H) 11.7 - 15.4 g/dL    HCT 45.9 35.8 - 46.3 %    MCV 83.5 79.6 - 97.8 FL    MCH 28.9 26.1 - 32.9 PG    MCHC 34.6 31.4 - 35.0 g/dL    RDW 13.3 11.9 - 14.6 %    PLATELET 005 445 - 103 K/uL    MPV 8.7 (L) 9.4 - 12.3 FL    ABSOLUTE NRBC 0.00 0.0 - 0.2 K/uL    DF AUTOMATED      NEUTROPHILS 78 43 - 78 %    LYMPHOCYTES 14 13 - 44 %    MONOCYTES 7 4.0 - 12.0 %    EOSINOPHILS 0 (L) 0.5 - 7.8 %    BASOPHILS 0 0.0 - 2.0 %    IMMATURE GRANULOCYTES 1 0.0 - 5.0 %    ABS. NEUTROPHILS 14.2 (H) 1.7 - 8.2 K/UL    ABS. LYMPHOCYTES 2.6 0.5 - 4.6 K/UL    ABS. MONOCYTES 1.3 0.1 - 1.3 K/UL    ABS. EOSINOPHILS 0.0 0.0 - 0.8 K/UL    ABS.  BASOPHILS 0.1 0.0 - 0.2 K/UL ABS. IMM. GRANS. 0.1 0.0 - 0.5 K/UL   METABOLIC PANEL, COMPREHENSIVE    Collection Time: 10/26/20  7:18 PM   Result Value Ref Range    Sodium 137 136 - 145 mmol/L    Potassium 5.5 (H) 3.5 - 5.1 mmol/L    Chloride 107 98 - 107 mmol/L    CO2 20 (L) 21 - 32 mmol/L    Anion gap 10 7 - 16 mmol/L    Glucose 114 (H) 65 - 100 mg/dL    BUN 22 8 - 23 MG/DL    Creatinine 1.38 (H) 0.6 - 1.0 MG/DL    GFR est AA 49 (L) >60 ml/min/1.73m2    GFR est non-AA 41 (L) >60 ml/min/1.73m2    Calcium 9.8 8.3 - 10.4 MG/DL    Bilirubin, total 1.3 (H) 0.2 - 1.1 MG/DL    ALT (SGPT) 27 12 - 65 U/L    AST (SGOT) 72 (H) 15 - 37 U/L    Alk. phosphatase 232 (H) 50 - 136 U/L    Protein, total 7.9 6.3 - 8.2 g/dL    Albumin 3.7 3.2 - 4.6 g/dL    Globulin 4.2 (H) 2.3 - 3.5 g/dL    A-G Ratio 0.9 (L) 1.2 - 3.5       Imaging /Procedures /Studies     Assessment and Plan: Active Hospital Problems    Diagnosis Date Noted    SBO (small bowel obstruction) ([facility] 10/27/2020    Anxiety and depression 10/27/2020    History of gastric bypass 10/27/2020     Eden-en- y      Chronic diastolic congestive heart failure ([facility] 03/18/2017     Echo 2015: EF 60-65%, G2 LVDD, no significant valvular disease    Last Assessment & Plan:   Patient with recent increase in BLE edema prompting evaluation by PCP. She had 7# weight loss and complete resolution of edema on 20mg Torsemide x 1 week. Started on every other day dosing and has noted slow increase in weight and BLE edema. She also notes some dizziness which has been unchanged since last visit. Had noted a low BP (80s/60s) when working with home PT, but has never had that low of pressure in the office. Lab work recently collected and unremarkable.   - Continue Coreg  - Increase Torsemide to alternate 20mg and 10mg every other day  - Requested patient to take BP 3 x weekly   - F/U in 6 weeks      Essential hypertension 08/12/2015     Last Assessment & Plan:   D/w pt and there are multiple medications that could be contributing to her feeling lightheaded or dizzy. Her BP is on the lower side of normal and will go ahead and decrease her diuretic per the cariology note dated 10/7. She agrees to the following regimen:  1. Decrease Torsemide (from 20 mg every other day) to 10 mg every other day (decrease KCl 20 mEq [from daily] to every other day). May take an additional dose of 10 mg on one day of the week for increased peripheral edema or wt gain. 2. Continue Coreg 3.125 mg BID      Acquired hypothyroidism 08/11/2015     Labile hypothyroidism due to absorption issues s/p gastric bypass. Patient reported adverse reaction to Synthroid. Stable on Levoxyl. Last Assessment & Plan:   Labile hypothyroidism due to absorption issues s/p gastric bypass. Currently stable on Levoxyl 137 mcg. Repeat TSH and free T4 pending.          PLAN  SBO  - ER consulted General Surgery who recommended Hospitalist admission, discontinuation of NG tube, and antibiotics  - History notable for prior gastric bypass  - Will continue zosyn as started in ER  - NPO    HTN  - Monitor  - NPO for now    Hypothyroidism  - Takes Levoxyl  - Holding PO for now    dCHF  - Stable          Anticipated discharge: 2-3 days, pending clinical course    Signed By: Leana Penaloza MD     October 27, 2020

## 2020-10-27 NOTE — PROGRESS NOTES
Care Management Interventions  PCP Verified by CM: Yes(Dr Hannah Almanza)  Last Visit to PCP: 10/22/20  Mode of Transport at Discharge: Other (see comment)(Magan Silva    [phone]  )  Transition of Care Consult (CM Consult): Discharge Planning  Discharge Durable Medical Equipment: No  Physical Therapy Consult: No  Occupational Therapy Consult: No  Current Support Network: Lives Alone  Confirm Follow Up Transport: Friends  Discharge Location  Discharge Placement: Home    Pt admitted to 3rd floor [facility] for SBO. CM met with pt to discuss CM needs & DCP. Pt is A&Ox4. Pt is partially dependent at  home with all ADLS. Pt has a [facility] for 6 hours/5 days/wk. Pt lives with alone. Pt has cane, rollator, SC, raised toilet seat. Pt has no difficulty with obtaining medications with her Medicare. CLTC provides pt's transport. To appointments. DCP home with CLTC assistance No further needs noted. CM to continue to monitor.

## 2020-10-27 NOTE — PROGRESS NOTES
TRANSFER - IN REPORT:    Verbal report received from Tram Connolly 226, RN on Judy Couch being received from ED for routine progression of care. Report consisted of patients Situation, Background, Assessment and Recommendations(SBAR). Information from the following report(s) SBAR, Kardex, ED Summary, Procedure Summary, Intake/Output, MAR and Recent Results was reviewed. Opportunity for questions and clarification was provided. Assessment completed upon patients arrival to unit and care assumed. Patient received to room   319. Patient connected to monitor and assessment completed. Plan of care reviewed. Patient oriented to room and call light. Patient aware to use call light to communicate any chest pain or needs. Admission skin assessment completed with second RN and reveals the following: Patient has scars across chest from previous injury/surgery. Sacrum and heels are dry and intact. Patient has been instructed to use the call light before getting up out of bed.

## 2020-10-27 NOTE — ROUTINE PROCESS
Verbal bedside report given to ervin Blakelyoming RN. Patient's situation, background, assessment and recommendations provided. Opportunity for questions provided. Oncoming RN assumed care of patient.

## 2020-10-27 NOTE — ROUTINE PROCESS
Bedside and Verbal shift change report given to Kassie Souza RN (oncoming nurse) by self Francine Parada nurse). Report included the following information SBAR, Kardex, Intake/Output, MAR, Recent Results

## 2020-10-27 NOTE — ED PROVIDER NOTES
77-year-old female presents with a history of 2 days of nausea vomiting and diarrhea. She estimates she is thrown up over 20 times, and this has been refractory to Zofran she has had at home. She estimates that she has had even more spells of diarrhea. There has been no blood or hematochezia or hematemesis. She also has diffuse abdominal pain both in the upper and lower abdominal segments. This started roughly simultaneously with the vomiting and diarrhea. History significant for prior colon cancer about 5-1/2 years ago, presumably underwent a partial colectomy. Patient indicates [place], and then relates shortly thereafter she had an obstruction which was operated on at [facility]. She also has a history of ulcerative pancolitis. And chart reflects prior cyclical vomiting syndrome as well. She confirms prior history of diverticulitis as well    She has no chest pain no dyspnea no UTI symptoms. No fevers or chills. No past medical history on file. No past surgical history on file. No family history on file.     Social History     Socioeconomic History    Marital status:      Spouse name: Not on file    Number of children: Not on file    Years of education: Not on file    Highest education level: Not on file   Occupational History    Not on file   Social Needs    Financial resource strain: Not on file    Food insecurity     Worry: Not on file     Inability: Not on file    Transportation needs     Medical: Not on file     Non-medical: Not on file   Tobacco Use    Smoking status: Not on file   Substance and Sexual Activity    Alcohol use: Not on file    Drug use: Not on file    Sexual activity: Not on file   Lifestyle    Physical activity     Days per week: Not on file     Minutes per session: Not on file    Stress: Not on file   Relationships    Social connections     Talks on phone: Not on file     Gets together: Not on file     Attends [employer] service: Not on file     Active member of club or organization: Not on file     Attends meetings of clubs or organizations: Not on file     Relationship status: Not on file    Intimate partner violence     Fear of current or ex partner: Not on file     Emotionally abused: Not on file     Physically abused: Not on file     Forced sexual activity: Not on file   Other Topics Concern    Not on file   Social History Narrative    Not on file         ALLERGIES: Latex    Review of Systems   Constitutional: Negative for chills and fever. HENT: Negative for rhinorrhea and sore throat. Eyes: Negative for discharge and redness. Respiratory: Negative for cough and shortness of breath. Cardiovascular: Negative for chest pain and palpitations. Gastrointestinal: Positive for abdominal pain, diarrhea and vomiting. Negative for anal bleeding, blood in stool and nausea. Genitourinary: Negative for difficulty urinating and dysuria. Musculoskeletal: Negative for arthralgias and back pain. Skin: Negative for rash. Neurological: Negative for dizziness and headaches. All other systems reviewed and are negative. Vitals:    10/26/20 1903 10/26/20 2158 10/26/20 2159 10/26/20 2210   BP: 107/68 132/85     Pulse: (!) 116      Resp: 20      Temp: 99.7 °F (37.6 °C)      SpO2: 97%  100%    Weight:    49.9 kg (110 lb)   Height:    5' 4\" (1.626 m)            Physical Exam  Vitals signs and nursing note reviewed. Constitutional:       General: She is in acute distress. Appearance: Normal appearance. She is well-developed. She is not ill-appearing, toxic-appearing or diaphoretic. HENT:      Head: Normocephalic and atraumatic. Eyes:      General: No scleral icterus. Right eye: No discharge. Left eye: No discharge. Conjunctiva/sclera: Conjunctivae normal.      Pupils: Pupils are equal, round, and reactive to light.    Neck:      Musculoskeletal: Normal range of motion and neck supple. Cardiovascular:      Rate and Rhythm: Regular rhythm. Tachycardia present. Heart sounds: Normal heart sounds. No murmur. No gallop. Pulmonary:      Effort: Pulmonary effort is normal. No respiratory distress. Breath sounds: Normal breath sounds. No wheezing or rales. Abdominal:      General: There is distension. Palpations: Abdomen is soft. Tenderness: There is abdominal tenderness. There is no guarding. Musculoskeletal: Normal range of motion. Skin:     General: Skin is warm and dry. Neurological:      General: No focal deficit present. Mental Status: She is alert and oriented to person, place, and time. Mental status is at baseline. Motor: No abnormal muscle tone. Comments: cni 2-12 grossly   Psychiatric:         Attention and Perception: Attention normal.         Mood and Affect: Mood normal.         Behavior: Behavior normal.         Cognition and Memory: Memory is impaired. MDM  Number of Diagnoses or Management Options  Dehydration: new and requires workup  History of Eden-en-Y gastric bypass: established and worsening  SBO (small bowel obstruction) [facility]): new and requires workup  Diagnosis management comments: Medical decision making note:  Abdominal pain with nausea vomiting in a patient with a fairly extensive prior abdominal history. Look cytosis noted, based on white count and degree of pain we will going perform a CT scan. Will administer some fluids, antiemetics, and analgesics. 1:20 AM ct is suspicious for a proximal small bowel obstruction/closed-loop obstruction related to Eden-en-Y gastric bypass  Place an NG tube, and then discuss with surgery, as this is something of a special case bowel obstruction. Patient reports only having had one prior SBO previously.     1:51 AM discussed with surgery on-call who requests we canceled the NG tube, admit her to medicine in light of her dehydration for further hydration. Surgery requests banana bag, and antibiotics and keep her n.p.o. after midnight. They will see her in the morning, on the medicine service    This concludes the \"medical decision making note\" part of this emergency department visit note. Amount and/or Complexity of Data Reviewed  Clinical lab tests: reviewed and ordered (Results Include:    Recent Results (from the past 24 hour(s))  -CBC WITH AUTOMATED DIFF  Collection Time: 10/26/20  7:18 PM       Result                      Value             Ref Range           WBC                         18.3 (H)          4.3 - 11.1 K*       RBC                         5.50 (H)          4.05 - 5.2 M*       HGB                         15.9 (H)          11.7 - 15.4 *       HCT                         45.9              35.8 - 46.3 %       MCV                         83.5              79.6 - 97.8 *       MCH                         28.9              26.1 - 32.9 *       MCHC                        34.6              31.4 - 35.0 *       RDW                         13.3              11.9 - 14.6 %       PLATELET                    328               150 - 450 K/*       MPV                         8.7 (L)           9.4 - 12.3 FL       ABSOLUTE NRBC               0.00              0.0 - 0.2 K/*       DF                          AUTOMATED                             NEUTROPHILS                 78                43 - 78 %           LYMPHOCYTES                 14                13 - 44 %           MONOCYTES                   7                 4.0 - 12.0 %        EOSINOPHILS                 0 (L)             0.5 - 7.8 %         BASOPHILS                   0                 0.0 - 2.0 %         IMMATURE GRANULOCYTES       1                 0.0 - 5.0 %         ABS. NEUTROPHILS            14.2 (H)          1.7 - 8.2 K/*       ABS. LYMPHOCYTES            2.6               0.5 - 4.6 K/*       ABS. MONOCYTES              1.3               0.1 - 1.3 K/*       ABS.  EOSINOPHILS 0.0               0.0 - 0.8 K/*       ABS. BASOPHILS              0.1               0.0 - 0.2 K/*       ABS. IMM. GRANS.            0.1               0.0 - 0.5 K/*  -METABOLIC PANEL, COMPREHENSIVE  Collection Time: 10/26/20  7:18 PM       Result                      Value             Ref Range           Sodium                      137               136 - 145 mm*       Potassium                   5.5 (H)           3.5 - 5.1 mm*       Chloride                    107               98 - 107 mmo*       CO2                         20 (L)            21 - 32 mmol*       Anion gap                   10                7 - 16 mmol/L       Glucose                     114 (H)           65 - 100 mg/*       BUN                         22                8 - 23 MG/DL        Creatinine                  1.38 (H)          0.6 - 1.0 MG*       GFR est AA                  49 (L)            >60 ml/min/1*       GFR est non-AA              41 (L)            >60 ml/min/1*       Calcium                     9.8               8.3 - 10.4 M*       Bilirubin, total            1.3 (H)           0.2 - 1.1 MG*       ALT (SGPT)                  27                12 - 65 U/L         AST (SGOT)                  72 (H)            15 - 37 U/L         Alk.  phosphatase            232 (H)           50 - 136 U/L        Protein, total              7.9               6.3 - 8.2 g/*       Albumin                     3.7               3.2 - 4.6 g/*       Globulin                    4.2 (H)           2.3 - 3.5 g/*       A-G Ratio                   0.9 (L)           1.2 - 3.5      )  Tests in the radiology section of CPT®: reviewed and ordered (CT ABD PELV W CONT   Final Result    IMPRESSION: There is a dilated bowel loop in the right upper quadrant, containing oral contrast, which extends towards the liver hilum, possibly an obstructed afferent loop of a Eden-en-Y.           )  Discuss the patient with other providers: yes (Surgery, then hospitalist)    Risk of Complications, Morbidity, and/or Mortality  Presenting problems: high  Diagnostic procedures: high    Patient Progress  Patient progress: improved         Procedures

## 2020-10-27 NOTE — ED NOTES
TRANSFER - OUT REPORT:    Verbal report given to DEMETRIUS Roe(name) on Eb Villela  being transferred to [facility](unit) for routine progression of care       Report consisted of patients Situation, Background, Assessment and   Recommendations(SBAR). Information from the following report(s) SBAR and ED Summary was reviewed with the receiving nurse. Lines:   Peripheral IV 10/26/20 Right Hand (Active)   Site Assessment Clean, dry, & intact 10/26/20 1918   Phlebitis Assessment 0 10/26/20 1918   Infiltration Assessment 0 10/26/20 1918   Dressing Status Clean, dry, & intact 10/26/20 1918       Peripheral IV 10/27/20 Left Forearm (Active)        Opportunity for questions and clarification was provided.       Patient transported with:   Hello Curry

## 2020-10-28 LAB
ANION GAP SERPL CALC-SCNC: 7 MMOL/L (ref 7–16)
BUN SERPL-MCNC: 13 MG/DL (ref 8–23)
CALCIUM SERPL-MCNC: 8.2 MG/DL (ref 8.3–10.4)
CHLORIDE SERPL-SCNC: 119 MMOL/L (ref 98–107)
CO2 SERPL-SCNC: 20 MMOL/L (ref 21–32)
CREAT SERPL-MCNC: 0.81 MG/DL (ref 0.6–1)
ERYTHROCYTE [DISTWIDTH] IN BLOOD BY AUTOMATED COUNT: 14.2 % (ref 11.9–14.6)
GLUCOSE SERPL-MCNC: 83 MG/DL (ref 65–100)
HCT VFR BLD AUTO: 33.4 % (ref 35.8–46.3)
HGB BLD-MCNC: 11 G/DL (ref 11.7–15.4)
MCH RBC QN AUTO: 29.3 PG (ref 26.1–32.9)
MCHC RBC AUTO-ENTMCNC: 32.9 G/DL (ref 31.4–35)
MCV RBC AUTO: 89.1 FL (ref 79.6–97.8)
NRBC # BLD: 0 K/UL (ref 0–0.2)
PLATELET # BLD AUTO: 112 K/UL (ref 150–450)
PMV BLD AUTO: 8.7 FL (ref 9.4–12.3)
POTASSIUM SERPL-SCNC: 3.9 MMOL/L (ref 3.5–5.1)
RBC # BLD AUTO: 3.75 M/UL (ref 4.05–5.2)
SODIUM SERPL-SCNC: 146 MMOL/L (ref 136–145)
WBC # BLD AUTO: 11.5 K/UL (ref 4.3–11.1)

## 2020-10-28 PROCEDURE — 74011000258 HC RX REV CODE- 258: Performed by: FAMILY MEDICINE

## 2020-10-28 PROCEDURE — 36415 COLL VENOUS BLD VENIPUNCTURE: CPT

## 2020-10-28 PROCEDURE — 74011250636 HC RX REV CODE- 250/636: Performed by: FAMILY MEDICINE

## 2020-10-28 PROCEDURE — 65270000029 HC RM PRIVATE

## 2020-10-28 PROCEDURE — 85027 COMPLETE CBC AUTOMATED: CPT

## 2020-10-28 PROCEDURE — 80048 BASIC METABOLIC PNL TOTAL CA: CPT

## 2020-10-28 PROCEDURE — 74011250637 HC RX REV CODE- 250/637: Performed by: SURGERY

## 2020-10-28 PROCEDURE — 74011250637 HC RX REV CODE- 250/637: Performed by: HOSPITALIST

## 2020-10-28 PROCEDURE — 74011250636 HC RX REV CODE- 250/636: Performed by: HOSPITALIST

## 2020-10-28 RX ORDER — LEVOTHYROXINE SODIUM 125 UG/1
125 TABLET ORAL DAILY
Status: DISCONTINUED | OUTPATIENT
Start: 2020-10-29 | End: 2020-10-29 | Stop reason: HOSPADM

## 2020-10-28 RX ORDER — ZONISAMIDE 100 MG/1
100 CAPSULE ORAL DAILY
Status: DISCONTINUED | OUTPATIENT
Start: 2020-10-29 | End: 2020-10-29 | Stop reason: HOSPADM

## 2020-10-28 RX ORDER — PANTOPRAZOLE SODIUM 40 MG/1
40 TABLET, DELAYED RELEASE ORAL
Status: DISCONTINUED | OUTPATIENT
Start: 2020-10-28 | End: 2020-10-29 | Stop reason: HOSPADM

## 2020-10-28 RX ORDER — MESALAMINE 800 MG/1
1600 TABLET, DELAYED RELEASE ORAL 3 TIMES DAILY
Status: DISCONTINUED | OUTPATIENT
Start: 2020-10-28 | End: 2020-10-29 | Stop reason: HOSPADM

## 2020-10-28 RX ORDER — ZONISAMIDE 100 MG/1
300 CAPSULE ORAL EVERY EVENING
Status: DISCONTINUED | OUTPATIENT
Start: 2020-10-28 | End: 2020-10-29 | Stop reason: HOSPADM

## 2020-10-28 RX ORDER — KETOROLAC TROMETHAMINE 15 MG/ML
15 INJECTION, SOLUTION INTRAMUSCULAR; INTRAVENOUS
Status: DISCONTINUED | OUTPATIENT
Start: 2020-10-28 | End: 2020-10-29 | Stop reason: HOSPADM

## 2020-10-28 RX ORDER — TRIHEXYPHENIDYL HYDROCHLORIDE 2 MG/1
2 TABLET ORAL 3 TIMES DAILY
Status: DISCONTINUED | OUTPATIENT
Start: 2020-10-28 | End: 2020-10-29 | Stop reason: HOSPADM

## 2020-10-28 RX ORDER — DULOXETIN HYDROCHLORIDE 60 MG/1
60 CAPSULE, DELAYED RELEASE ORAL 2 TIMES DAILY
Status: DISCONTINUED | OUTPATIENT
Start: 2020-10-28 | End: 2020-10-29 | Stop reason: HOSPADM

## 2020-10-28 RX ORDER — CARVEDILOL 3.12 MG/1
3.12 TABLET ORAL 2 TIMES DAILY
Status: DISCONTINUED | OUTPATIENT
Start: 2020-10-28 | End: 2020-10-29 | Stop reason: HOSPADM

## 2020-10-28 RX ORDER — ZONISAMIDE 100 MG/1
2 CAPSULE ORAL 2 TIMES DAILY
Status: DISCONTINUED | OUTPATIENT
Start: 2020-10-28 | End: 2020-10-28

## 2020-10-28 RX ORDER — DICYCLOMINE HYDROCHLORIDE 20 MG/1
20 TABLET ORAL
Status: DISCONTINUED | OUTPATIENT
Start: 2020-10-28 | End: 2020-10-29 | Stop reason: HOSPADM

## 2020-10-28 RX ORDER — TORSEMIDE 20 MG/1
10 TABLET ORAL 3 TIMES DAILY
Status: DISCONTINUED | OUTPATIENT
Start: 2020-10-28 | End: 2020-10-29

## 2020-10-28 RX ORDER — LORATADINE 10 MG/1
5 TABLET ORAL
Status: DISCONTINUED | OUTPATIENT
Start: 2020-10-28 | End: 2020-10-29 | Stop reason: HOSPADM

## 2020-10-28 RX ORDER — ATORVASTATIN CALCIUM 80 MG/1
80 TABLET, FILM COATED ORAL
Status: DISCONTINUED | OUTPATIENT
Start: 2020-10-28 | End: 2020-10-29 | Stop reason: HOSPADM

## 2020-10-28 RX ORDER — PREGABALIN 100 MG/1
100 CAPSULE ORAL EVERY 12 HOURS
Status: DISCONTINUED | OUTPATIENT
Start: 2020-10-28 | End: 2020-10-29 | Stop reason: HOSPADM

## 2020-10-28 RX ORDER — LACOSAMIDE 100 MG/1
200 TABLET ORAL EVERY 12 HOURS
Status: DISCONTINUED | OUTPATIENT
Start: 2020-10-28 | End: 2020-10-29 | Stop reason: HOSPADM

## 2020-10-28 RX ADMIN — Medication 10 ML: at 06:10

## 2020-10-28 RX ADMIN — ATORVASTATIN CALCIUM 80 MG: 80 TABLET, FILM COATED ORAL at 22:28

## 2020-10-28 RX ADMIN — DULOXETINE HYDROCHLORIDE 60 MG: 60 CAPSULE, DELAYED RELEASE ORAL at 18:35

## 2020-10-28 RX ADMIN — KETOROLAC TROMETHAMINE 15 MG: 15 INJECTION, SOLUTION INTRAMUSCULAR; INTRAVENOUS at 19:03

## 2020-10-28 RX ADMIN — TRIHEXYPHENIDYL HYDROCHLORIDE 2 MG: 2 TABLET ORAL at 22:29

## 2020-10-28 RX ADMIN — PREGABALIN 100 MG: 100 CAPSULE ORAL at 21:49

## 2020-10-28 RX ADMIN — ENOXAPARIN SODIUM 40 MG: 40 INJECTION SUBCUTANEOUS at 08:25

## 2020-10-28 RX ADMIN — ONDANSETRON 4 MG: 2 INJECTION INTRAMUSCULAR; INTRAVENOUS at 14:32

## 2020-10-28 RX ADMIN — PANTOPRAZOLE SODIUM 40 MG: 40 TABLET, DELAYED RELEASE ORAL at 18:35

## 2020-10-28 RX ADMIN — LACOSAMIDE 200 MG: 100 TABLET, FILM COATED ORAL at 21:49

## 2020-10-28 RX ADMIN — TRIHEXYPHENIDYL HYDROCHLORIDE 2 MG: 2 TABLET ORAL at 15:59

## 2020-10-28 RX ADMIN — PIPERACILLIN SODIUM AND TAZOBACTAM SODIUM 3.38 G: 3; .375 INJECTION, POWDER, LYOPHILIZED, FOR SOLUTION INTRAVENOUS at 04:46

## 2020-10-28 RX ADMIN — ONDANSETRON 4 MG: 2 INJECTION INTRAMUSCULAR; INTRAVENOUS at 20:46

## 2020-10-28 RX ADMIN — CARVEDILOL 3.12 MG: 3.12 TABLET, FILM COATED ORAL at 18:35

## 2020-10-28 RX ADMIN — HYDROCODONE BITARTRATE AND ACETAMINOPHEN 1 TABLET: 5; 325 TABLET ORAL at 08:25

## 2020-10-28 RX ADMIN — ONDANSETRON 4 MG: 2 INJECTION INTRAMUSCULAR; INTRAVENOUS at 08:25

## 2020-10-28 RX ADMIN — ZONISAMIDE 300 MG: 100 CAPSULE ORAL at 21:22

## 2020-10-28 RX ADMIN — Medication 10 ML: at 22:34

## 2020-10-28 RX ADMIN — PIPERACILLIN SODIUM AND TAZOBACTAM SODIUM 3.38 G: 3; .375 INJECTION, POWDER, LYOPHILIZED, FOR SOLUTION INTRAVENOUS at 12:25

## 2020-10-28 RX ADMIN — LORATADINE 5 MG: 10 TABLET ORAL at 22:29

## 2020-10-28 RX ADMIN — HYDROCODONE BITARTRATE AND ACETAMINOPHEN 1 TABLET: 5; 325 TABLET ORAL at 14:32

## 2020-10-28 RX ADMIN — BISACODYL 10 MG: 10 SUPPOSITORY RECTAL at 08:25

## 2020-10-28 RX ADMIN — MESALAMINE 1600 MG: 800 TABLET, DELAYED RELEASE ORAL at 22:29

## 2020-10-28 NOTE — ROUTINE PROCESS
Verbal bedside report given to [facility], oncoming RN. Patient's situation, background, assessment and recommendations provided. Opportunity for questions provided. Oncoming RN assumed care of patient.

## 2020-10-28 NOTE — PROGRESS NOTES
Problem: Falls - Risk of  Goal: *Absence of Falls  Description: Document Flaquito Barillas Fall Risk and appropriate interventions in the flowsheet.   Outcome: Progressing Towards Goal  Note: Fall Risk Interventions:  Mobility Interventions: Bed/chair exit alarm, Communicate number of staff needed for ambulation/transfer, Patient to call before getting OOB         Medication Interventions: Bed/chair exit alarm, Evaluate medications/consider consulting pharmacy, Patient to call before getting OOB, Teach patient to arise slowly    Elimination Interventions: Bed/chair exit alarm, Call light in reach, Patient to call for help with toileting needs, Toileting schedule/hourly rounds    History of Falls Interventions: Bed/chair exit alarm, Evaluate medications/consider consulting pharmacy, Door open when patient unattended

## 2020-10-28 NOTE — PROGRESS NOTES
HOSPITALIST H&P/CONSULT  NAME:  Emerson Oreilly   Age:  72 y.o.  :   1955   MRN:   [id number]  PCP: Ramses Goyal MD  Consulting MD:  Treatment Team: Attending Provider: Marcelina Kapadia MD; Care Manager: Kate Stevens RN; Utilization Review: Luis Corona Primary Nurse: Reji Shaver RN  HPI:   Patient is a 69yoF with PMH significant for gastric bypass surgery, HLD, HTN who presents with nausea, vomiting, and diarrhea. Patient reports that this started yesterday. She states that she has tried zofran, but it did not help. She also stated that she began having abdominal pain around the onset of her N/V/D. She states that she still feels nauseated, but has not had any diarrhea since arriving to the hospital.  On ER workup, patient was found to have evidence of a SBO on CT, possibly involving the afferent loop of Eden-en-Y. General Surgery consulted. Recommended Hospitalist admission. Complete ROS done and is as stated in HPI or otherwise negative. Past Medical History reviewed. Had SBO about 2 years ago. Past Surgical History includes Eden-en-Y gastric bypass. 10/28 patient reports some nausea with drinking broths. Still has some abdominal pain. She reports small BM this morning. Afebrile. Prior to Admission Medications   Prescriptions Last Dose Informant Patient Reported? Taking? Blood Pressure Monitor kit   Yes Yes   Si Each by Not Applicable route daily. DULoxetine (CYMBALTA) 60 mg capsule   Yes Yes   Sig: Take 60 mg by mouth two (2) times a day. alteplase (CATHFLO) 2 mg injection   Yes No   Si mg by InterCATHeter route as needed. atorvastatin (LIPITOR) 80 mg tablet   Yes Yes   Sig: Take 80 mg by mouth. carvediloL (COREG) 3.125 mg tablet   Yes Yes   Sig: Take 3.125 mg by mouth two (2) times a day. cholecalciferol (VITAMIN D3) (5000 Units/125 mcg) tab tablet   Yes No   Sig: Take 5,000 Units by mouth Every morning.    clopidogreL (PLAVIX) 75 mg tab   Yes Yes   Sig: Take 75 mg by mouth daily. colestipoL (COLESTID) 5 gram packet   Yes No   Sig: Take 5 g by mouth three (3) times daily as needed. deutetrabenazine 6 mg tab   Yes Yes   Sig: Take 2 Tabs by mouth Every morning. deutetrabenazine 9 mg tab   Yes Yes   Sig: Take 2 Tabs by mouth. diclofenac (VOLTAREN) 1 % gel   Yes No   Sig: Apply 1-2 g to affected area. dicyclomine (BENTYL) 20 mg tablet   Yes Yes   Sig: Take 20 mg by mouth four (4) times daily as needed. hydrOXYzine HCL (ATARAX) 25 mg tablet   Yes Yes   Sig: Take 50 mg by mouth three (3) times daily as needed. lacosamide (VIMPAT) 200 mg tab tablet   Yes Yes   Sig: Take 200 mg by mouth two (2) times a day. levocetirizine (XYZAL) 5 mg tablet   Yes Yes   Sig: Take 2.5 mg by mouth nightly. levothyroxine (SYNTHROID) 125 mcg tablet   Yes Yes   Sig: Take 125 mcg by mouth daily. lifitegrast 5 % dpet   Yes No   Sig: Administer 1 Drop to both eyes two (2) times a day. mesalamine (LIALDA) 1.2 gram delayed release tablet   Yes Yes   Sig: Take 4.8 g by mouth daily. omeprazole (PRILOSEC) 20 mg capsule   Yes Yes   Sig: Take 20 mg by mouth two (2) times a day. ondansetron (ZOFRAN ODT) 8 mg disintegrating tablet   Yes Yes   Sig: Take 8 mg by mouth three (3) times daily as needed. oxyCODONE IR (ROXICODONE) 5 mg immediate release tablet   Yes Yes   Sig: Take 5 mg by mouth every six (6) hours as needed. potassium chloride (K-DUR, KLOR-CON) 20 mEq tablet   Yes Yes   Sig: Take 20 mEq by mouth every other day. pregabalin (LYRICA) 100 mg capsule   Yes Yes   Sig: Take 100 mg by mouth two (2) times a day. torsemide (DEMADEX) 10 mg tablet   Yes Yes   Sig: Take 10 mg by mouth three (3) times daily. trihexyphenidyL (ARTANE) 2 mg tablet   Yes Yes   Sig: Take 2 mg by mouth three (3) times daily. zonisamide (ZONEGRAN) 100 mg capsule   Yes Yes   Sig: Take 2 mg by mouth two (2) times a day.  100mg Capsule in the AM 300mg in the PM Facility-Administered Medications: None     Allergies   Allergen Reactions    Latex Rash    Tetanus And Diphther. Tox (Pf) Anaphylaxis    Doxycycline Nausea and Vomiting    Fentanyl Unknown (comments)     Pt had prior surgery and was told not take it again      Morphine Hives    Penicillins Other (comments)    Phenergan [Promethazine] Other (comments)     Pt stated her neurologist told her not to take it      Social History     Tobacco Use    Smoking status: Not on file   Substance Use Topics    Alcohol use: Not on file      No family history on file. Family History reviewed and is non-contributory to current presentation. Objective:     Visit Vitals  /61 (BP 1 Location: Right arm, BP Patient Position: At rest)   Pulse (!) 59   Temp 98.7 °F (37.1 °C)   Resp 14   Ht 5' 4\" (1.626 m)   Wt 70.9 kg (156 lb 4.8 oz)   SpO2 100%   BMI 26.83 kg/m²      Temp (24hrs), Av.8 °F (37.1 °C), Min:98.5 °F (36.9 °C), Max:98.9 °F (37.2 °C)    Oxygen Therapy  O2 Sat (%): 100 % (10/28/20 1633)  Pulse via Oximetry: 91 beats per minute (10/27/20 0328)  O2 Device: Room air (10/28/20 1600)     Physical Exam:  General:    Alert, cooperative, no distress, appears stated age. Head:   Normocephalic, without obvious abnormality, atraumatic. Nose:  Nares normal. No drainage or sinus tenderness. Lungs:   Clear to auscultation bilaterally. No Wheezing or Rhonchi. No rales. Heart:   Regular rate and rhythm, no murmur, rub or gallop. Abdomen:   Soft. Not distended, mild lower quadrant tenderness, active bowel sounds, no organomegaly  Extremities: No cyanosis. No edema. No clubbing. Skin:     Texture, turgor normal.  Not Jaundiced. Neurologic: Alert and oriented x 3, no focal deficits.      Data Review:   Recent Results (from the past 24 hour(s))   METABOLIC PANEL, BASIC    Collection Time: 10/28/20  4:53 AM   Result Value Ref Range    Sodium 146 (H) 136 - 145 mmol/L    Potassium 3.9 3.5 - 5.1 mmol/L    Chloride 119 (H) 98 - 107 mmol/L    CO2 20 (L) 21 - 32 mmol/L    Anion gap 7 7 - 16 mmol/L    Glucose 83 65 - 100 mg/dL    BUN 13 8 - 23 MG/DL    Creatinine 0.81 0.6 - 1.0 MG/DL    GFR est AA >60 >60 ml/min/1.73m2    GFR est non-AA >60 >60 ml/min/1.73m2    Calcium 8.2 (L) 8.3 - 10.4 MG/DL   CBC W/O DIFF    Collection Time: 10/28/20  4:53 AM   Result Value Ref Range    WBC 11.5 (H) 4.3 - 11.1 K/uL    RBC 3.75 (L) 4.05 - 5.2 M/uL    HGB 11.0 (L) 11.7 - 15.4 g/dL    HCT 33.4 (L) 35.8 - 46.3 %    MCV 89.1 79.6 - 97.8 FL    MCH 29.3 26.1 - 32.9 PG    MCHC 32.9 31.4 - 35.0 g/dL    RDW 14.2 11.9 - 14.6 %    PLATELET 118 (L) 785 - 450 K/uL    MPV 8.7 (L) 9.4 - 12.3 FL    ABSOLUTE NRBC 0.00 0.0 - 0.2 K/uL     Imaging /Procedures /Studies     Assessment and Plan: Active Hospital Problems    Diagnosis Date Noted    SBO (small bowel obstruction) ([facility] 10/27/2020    Anxiety and depression 10/27/2020    History of gastric bypass 10/27/2020     Eden-en- y      Chronic diastolic congestive heart failure ([facility] 03/18/2017     Echo 2015: EF 60-65%, G2 LVDD, no significant valvular disease    Last Assessment & Plan:   Patient with recent increase in BLE edema prompting evaluation by PCP. She had 7# weight loss and complete resolution of edema on 20mg Torsemide x 1 week. Started on every other day dosing and has noted slow increase in weight and BLE edema. She also notes some dizziness which has been unchanged since last visit. Had noted a low BP (80s/60s) when working with home PT, but has never had that low of pressure in the office. Lab work recently collected and unremarkable. - Continue Coreg  - Increase Torsemide to alternate 20mg and 10mg every other day  - Requested patient to take BP 3 x weekly   - F/U in 6 weeks      Essential hypertension 08/12/2015     Last Assessment & Plan:   D/w pt and there are multiple medications that could be contributing to her feeling lightheaded or dizzy.  Her BP is on the lower side of normal and will go ahead and decrease her diuretic per the cariology note dated 10/7. She agrees to the following regimen:  1. Decrease Torsemide (from 20 mg every other day) to 10 mg every other day (decrease KCl 20 mEq [from daily] to every other day). May take an additional dose of 10 mg on one day of the week for increased peripheral edema or wt gain. 2. Continue Coreg 3.125 mg BID      Acquired hypothyroidism 08/11/2015     Labile hypothyroidism due to absorption issues s/p gastric bypass. Patient reported adverse reaction to Synthroid. Stable on Levoxyl. Last Assessment & Plan:   Labile hypothyroidism due to absorption issues s/p gastric bypass. Currently stable on Levoxyl 137 mcg. Repeat TSH and free T4 pending. PLAN    This is a 70y Female with:    Small bowel obstruction present on admission  X-ray abdomen showed nonspecific bowel gas pattern. She received oral contrast for CT scan which is seen in the right and proximal to mid transverse colon no significant retention within the dilated proximal small bowel loop. General surgery was consulted. -Patient on clear liquid diet. Advance to full liquid diet today. If tolerated well will advance to GI soft diet tomorrow. HTN  - Monitor  -Resume home medications    Hypothyroidism  -Resume home medications    dCHF  - Stable  Restart torsemide      Anticipated discharge:  Home in 24 to 48 hours.      Signed By: Deangelo Clifton MD     October 28, 2020

## 2020-10-28 NOTE — ROUTINE PROCESS
Bedside and Verbal shift change report given to [facility], [facility], RN (oncoming nurse) by self (offgoing nurse). Report included the following information SBAR, Kardex, Intake/Output, MAR, Recent Results

## 2020-10-28 NOTE — ROUTINE PROCESS
Bedside and Verbal shift change report given to myself (oncoming nurse) by Emilia Jean RN (offgoing nurse). Report included the following information SBAR, Kardex, MAR and Recent Results.

## 2020-10-28 NOTE — PROGRESS NOTES
Care Management Interventions  PCP Verified by CM: Yes(Dr Nadir Josue)  Last Visit to PCP: 10/22/20  Mode of Transport at Discharge: Other (see comment)(Jo Silva    [phone]  )  Transition of Care Consult (CM Consult): Discharge Planning  Discharge Durable Medical Equipment: No  Physical Therapy Consult: No  Occupational Therapy Consult: No  Current Support Network: Lives Alone  Confirm Follow Up Transport: Friends  Discharge Location  Discharge Placement: Home    CM received a message of a phone call last evening from pt's sister verbalizing concerns of the need to notify Cristela Bruce ,  Blaire Gates ([phone]) of pt's hospitalization for her CLTC (she does not know the name of the aide). 1020 CM received a call back from Blaire Gates. She states pt is seen by [address] [place] [address]. She will contact the facility and inform them of pt's hospitalization. 1550 Pt requested to speak with CM. Pt states she has OP PT with [facility]. She has contacted them to inform them of her hospitalization.

## 2020-10-29 VITALS
OXYGEN SATURATION: 99 % | WEIGHT: 156.3 LBS | RESPIRATION RATE: 16 BRPM | SYSTOLIC BLOOD PRESSURE: 133 MMHG | BODY MASS INDEX: 26.69 KG/M2 | HEIGHT: 64 IN | TEMPERATURE: 97.7 F | HEART RATE: 78 BPM | DIASTOLIC BLOOD PRESSURE: 65 MMHG

## 2020-10-29 LAB
ANION GAP SERPL CALC-SCNC: 8 MMOL/L (ref 7–16)
BUN SERPL-MCNC: 10 MG/DL (ref 8–23)
CALCIUM SERPL-MCNC: 8.4 MG/DL (ref 8.3–10.4)
CHLORIDE SERPL-SCNC: 116 MMOL/L (ref 98–107)
CO2 SERPL-SCNC: 21 MMOL/L (ref 21–32)
CREAT SERPL-MCNC: 0.85 MG/DL (ref 0.6–1)
ERYTHROCYTE [DISTWIDTH] IN BLOOD BY AUTOMATED COUNT: 13.8 % (ref 11.9–14.6)
GLUCOSE SERPL-MCNC: 82 MG/DL (ref 65–100)
HCT VFR BLD AUTO: 32.3 % (ref 35.8–46.3)
HGB BLD-MCNC: 10.5 G/DL (ref 11.7–15.4)
MCH RBC QN AUTO: 28.6 PG (ref 26.1–32.9)
MCHC RBC AUTO-ENTMCNC: 32.5 G/DL (ref 31.4–35)
MCV RBC AUTO: 88 FL (ref 79.6–97.8)
NRBC # BLD: 0 K/UL (ref 0–0.2)
PLATELET # BLD AUTO: 165 K/UL (ref 150–450)
PMV BLD AUTO: 9 FL (ref 9.4–12.3)
POTASSIUM SERPL-SCNC: 3.1 MMOL/L (ref 3.5–5.1)
RBC # BLD AUTO: 3.67 M/UL (ref 4.05–5.2)
SODIUM SERPL-SCNC: 145 MMOL/L (ref 138–145)
WBC # BLD AUTO: 7.2 K/UL (ref 4.3–11.1)

## 2020-10-29 PROCEDURE — 2709999900 HC NON-CHARGEABLE SUPPLY

## 2020-10-29 PROCEDURE — 97110 THERAPEUTIC EXERCISES: CPT | Performed by: PHYSICAL THERAPIST

## 2020-10-29 PROCEDURE — 74011250637 HC RX REV CODE- 250/637: Performed by: HOSPITALIST

## 2020-10-29 PROCEDURE — 97161 PT EVAL LOW COMPLEX 20 MIN: CPT | Performed by: PHYSICAL THERAPIST

## 2020-10-29 PROCEDURE — 80048 BASIC METABOLIC PNL TOTAL CA: CPT

## 2020-10-29 PROCEDURE — 36415 COLL VENOUS BLD VENIPUNCTURE: CPT

## 2020-10-29 PROCEDURE — 74011250636 HC RX REV CODE- 250/636: Performed by: FAMILY MEDICINE

## 2020-10-29 PROCEDURE — 85027 COMPLETE CBC AUTOMATED: CPT

## 2020-10-29 RX ORDER — TORSEMIDE 10 MG/1
10 TABLET ORAL
Qty: 12 TAB | Refills: 0 | Status: SHIPPED
Start: 2020-10-30 | End: 2020-11-29

## 2020-10-29 RX ORDER — AMOXICILLIN 250 MG
1 CAPSULE ORAL DAILY
Qty: 15 TAB | Refills: 0 | Status: SHIPPED | OUTPATIENT
Start: 2020-10-29 | End: 2020-11-13

## 2020-10-29 RX ADMIN — PANTOPRAZOLE SODIUM 40 MG: 40 TABLET, DELAYED RELEASE ORAL at 16:31

## 2020-10-29 RX ADMIN — DULOXETINE HYDROCHLORIDE 60 MG: 60 CAPSULE, DELAYED RELEASE ORAL at 17:16

## 2020-10-29 RX ADMIN — MESALAMINE 1600 MG: 800 TABLET, DELAYED RELEASE ORAL at 16:32

## 2020-10-29 RX ADMIN — Medication 10 ML: at 06:03

## 2020-10-29 RX ADMIN — HYDROCODONE BITARTRATE AND ACETAMINOPHEN 1 TABLET: 5; 325 TABLET ORAL at 09:13

## 2020-10-29 RX ADMIN — Medication 10 ML: at 16:23

## 2020-10-29 RX ADMIN — DULOXETINE HYDROCHLORIDE 60 MG: 60 CAPSULE, DELAYED RELEASE ORAL at 09:02

## 2020-10-29 RX ADMIN — HYDROCODONE BITARTRATE AND ACETAMINOPHEN 1 TABLET: 5; 325 TABLET ORAL at 16:30

## 2020-10-29 RX ADMIN — ZONISAMIDE 300 MG: 100 CAPSULE ORAL at 17:14

## 2020-10-29 RX ADMIN — TRIHEXYPHENIDYL HYDROCHLORIDE 2 MG: 2 TABLET ORAL at 16:31

## 2020-10-29 RX ADMIN — TRIHEXYPHENIDYL HYDROCHLORIDE 2 MG: 2 TABLET ORAL at 09:07

## 2020-10-29 RX ADMIN — PREGABALIN 100 MG: 100 CAPSULE ORAL at 09:05

## 2020-10-29 RX ADMIN — CARVEDILOL 3.12 MG: 3.12 TABLET, FILM COATED ORAL at 08:58

## 2020-10-29 RX ADMIN — LEVOTHYROXINE SODIUM 125 MCG: 0.12 TABLET ORAL at 09:03

## 2020-10-29 RX ADMIN — LACOSAMIDE 200 MG: 100 TABLET, FILM COATED ORAL at 09:02

## 2020-10-29 RX ADMIN — ZONISAMIDE 100 MG: 100 CAPSULE ORAL at 09:08

## 2020-10-29 RX ADMIN — ENOXAPARIN SODIUM 40 MG: 40 INJECTION SUBCUTANEOUS at 09:08

## 2020-10-29 RX ADMIN — CARVEDILOL 3.12 MG: 3.12 TABLET, FILM COATED ORAL at 17:14

## 2020-10-29 RX ADMIN — POTASSIUM BICARBONATE 40 MEQ: 782 TABLET, EFFERVESCENT ORAL at 11:31

## 2020-10-29 RX ADMIN — MESALAMINE 1600 MG: 800 TABLET, DELAYED RELEASE ORAL at 09:04

## 2020-10-29 RX ADMIN — PANTOPRAZOLE SODIUM 40 MG: 40 TABLET, DELAYED RELEASE ORAL at 09:05

## 2020-10-29 RX ADMIN — ONDANSETRON 4 MG: 2 INJECTION INTRAMUSCULAR; INTRAVENOUS at 06:12

## 2020-10-29 RX ADMIN — HYDROCODONE BITARTRATE AND ACETAMINOPHEN 1 TABLET: 5; 325 TABLET ORAL at 01:03

## 2020-10-29 NOTE — DISCHARGE INSTRUCTIONS
Patient Education        Bowel Blockage (Intestinal Obstruction): Care Instructions  Your Care Instructions  A bowel blockage, also called an intestinal obstruction, can prevent gas, fluids, or solids from moving through the intestines normally. It can cause constipation and, rarely, diarrhea. You may have pain, nausea, vomiting, and cramping. Most of the time, complete blockages require a stay in the hospital and possibly surgery. But if your bowel is only partly blocked, your doctor may tell you to wait until it clears on its own and you are able to pass gas and stool. If so, there are things you can do at home to help make you feel better. If you have had surgery for a bowel blockage, there are things you can do at home to make sure you heal well. You can also make some changes to keep your bowel from becoming blocked again. Follow-up care is a key part of your treatment and safety. Be sure to make and go to all appointments, and call your doctor if you are having problems. It's also a good idea to know your test results and keep a list of the medicines you take. How can you care for yourself at home? If your doctor has told you to wait at home for a blockage to clear on its own:  · Follow your doctor's instructions. These may include eating a liquid diet to avoid complete blockage. · Be safe with medicines. Take your medicines exactly as prescribed. Call your doctor if you think you are having a problem with your medicine. · Put a heating pad set on low on your belly to relieve mild cramps and pain. To prevent another blockage  · Try to eat smaller amounts of food more often. For example, have 5 or 6 small meals throughout the day instead of 2 or 3 large meals. · Chew your food very well. Try to chew each bite about 20 times or until it is liquid. · Avoid high-fiber foods and raw fruits and vegetables with skins, husks, strings, or seeds.  These can form a ball of undigested material that can cause a blockage if a part of your bowel is scarred or narrowed. · Check with your doctor before you eat whole-grain products or use a fiber supplement such as Citrucel or Metamucil. · To help you have regular bowel movements, eat at regular times, do not strain during a bowel movement, and drink at least 8 to 10 glasses of water each day. If you have kidney, heart, or liver disease and have to limit fluids, talk with your doctor or before you increase the amount of fluids you drink. · Drink high-calorie liquid formulas if your doctor says to. Severe symptoms may make it hard for your body to take in vitamins and minerals. · Get regular exercise. It helps you digest your food better. Get at least 30 minutes of physical activity on most days of the week. Walking is a good choice. When should you call for help? Call your doctor now or seek immediate medical care if:    · You have a fever.     · You are vomiting.     · You have new or worse belly pain.     · You cannot pass stools or gas. Watch closely for changes in your health, and be sure to contact your doctor if you have any problems. Where can you learn more? Go to http://www.gray.com/  Enter B082 in the search box to learn more about \"Bowel Blockage (Intestinal Obstruction): Care Instructions. \"  Current as of: April 15, 2020               Content Version: 12.6  © 4122-9054 Healthwise, Incorporated. Care instructions adapted under license by [employer] (which disclaims liability or warranty for this information). If you have questions about a medical condition or this instruction, always ask your healthcare professional. [place] [address] any warranty or liability for your use of this information. Patient Education        High Blood Pressure: Care Instructions  Overview     It's normal for blood pressure to go up and down throughout the day. But if it stays up, you have high blood pressure.  Another name for high blood pressure is hypertension. Despite what a lot of people think, high blood pressure usually doesn't cause headaches or make you feel dizzy or lightheaded. It usually has no symptoms. But it does increase your risk of stroke, heart attack, and other problems. You and your doctor will talk about your risks of these problems based on your blood pressure. Your doctor will give you a goal for your blood pressure. Your goal will be based on your health and your age. Lifestyle changes, such as eating healthy and being active, are always important to help lower blood pressure. You might also take medicine to reach your blood pressure goal.  Follow-up care is a key part of your treatment and safety. Be sure to make and go to all appointments, and call your doctor if you are having problems. It's also a good idea to know your test results and keep a list of the medicines you take. How can you care for yourself at home? Medical treatment  · If you stop taking your medicine, your blood pressure will go back up. You may take one or more types of medicine to lower your blood pressure. Be safe with medicines. Take your medicine exactly as prescribed. Call your doctor if you think you are having a problem with your medicine. · Talk to your doctor before you start taking aspirin every day. Aspirin can help certain people lower their risk of a heart attack or stroke. But taking aspirin isn't right for everyone, because it can cause serious bleeding. · See your doctor regularly. You may need to see the doctor more often at first or until your blood pressure comes down. · If you are taking blood pressure medicine, talk to your doctor before you take decongestants or anti-inflammatory medicine, such as ibuprofen. Some of these medicines can raise blood pressure. · Learn how to check your blood pressure at home. Lifestyle changes  · Stay at a healthy weight.  This is especially important if you put on weight around the waist. Losing even 10 pounds can help you lower your blood pressure. · If your doctor recommends it, get more exercise. Walking is a good choice. Bit by bit, increase the amount you walk every day. Try for at least 30 minutes on most days of the week. You also may want to swim, bike, or do other activities. · Avoid or limit alcohol. Talk to your doctor about whether you can drink any alcohol. · Try to limit how much sodium you eat to less than 2,300 milligrams (mg) a day. Your doctor may ask you to try to eat less than 1,500 mg a day. · Eat plenty of fruits (such as bananas and oranges), vegetables, legumes, whole grains, and low-fat dairy products. · Lower the amount of saturated fat in your diet. Saturated fat is found in animal products such as milk, cheese, and meat. Limiting these foods may help you lose weight and also lower your risk for heart disease. · Do not smoke. Smoking increases your risk for heart attack and stroke. If you need help quitting, talk to your doctor about stop-smoking programs and medicines. These can increase your chances of quitting for good. When should you call for help? Call  911 anytime you think you may need emergency care. This may mean having symptoms that suggest that your blood pressure is causing a serious heart or blood vessel problem. Your blood pressure may be over 180/120. For example, call 911 if:    · You have symptoms of a heart attack. These may include:  ? Chest pain or pressure, or a strange feeling in the chest.  ? Sweating. ? Shortness of breath. ? Nausea or vomiting. ? Pain, pressure, or a strange feeling in the back, neck, jaw, or upper belly or in one or both shoulders or arms. ? Lightheadedness or sudden weakness. ? A fast or irregular heartbeat.     · You have symptoms of a stroke.  These may include:  ? Sudden numbness, tingling, weakness, or loss of movement in your face, arm, or leg, especially on only one side of your body.  ? Sudden vision changes. ? Sudden trouble speaking. ? Sudden confusion or trouble understanding simple statements. ? Sudden problems with walking or balance. ? A sudden, severe headache that is different from past headaches.     · You have severe back or belly pain. Do not wait until your blood pressure comes down on its own. Get help right away. Call your doctor now or seek immediate care if:    · Your blood pressure is much higher than normal (such as 180/120 or higher), but you don't have symptoms.     · You think high blood pressure is causing symptoms, such as:  ? Severe headache.  ? Blurry vision. Watch closely for changes in your health, and be sure to contact your doctor if:    · Your blood pressure measures higher than your doctor recommends at least 2 times. That means the top number is higher or the bottom number is higher, or both.     · You think you may be having side effects from your blood pressure medicine. Where can you learn more? Go to http://www.gray.com/  Enter [id number] in the search box to learn more about \"High Blood Pressure: Care Instructions. \"  Current as of: December 16, 2019               Content Version: 12.6  © 2568-9048 [employer] Care instructions adapted under license by [employer] (which disclaims liability or warranty for this information). If you have questions about a medical condition or this instruction, always ask your healthcare professional. [address] any warranty or liability for your use of this information. Check your blood pressures 3x per week. Patient Education        Avoiding Triggers With Heart Failure: Care Instructions  Your Care Instructions     Triggers are anything that make your heart failure flare up. A flare-up is also called \"sudden heart failure\" or \"acute heart failure. \" When you have a flare-up, fluid builds up in your lungs, and you have problems breathing. You might need to go to the hospital. By watching for changes in your condition and avoiding triggers, you can prevent heart failure flare-ups. Follow-up care is a key part of your treatment and safety. Be sure to make and go to all appointments, and call your doctor if you are having problems. It's also a good idea to know your test results and keep a list of the medicines you take. How can you care for yourself at home? Watch for changes in your weight and condition  · Weigh yourself without clothing at the same time each day. Record your weight. Call your doctor if you have sudden weight gain, such as more than 2 to 3 pounds in a day or 5 pounds in a week. (Your doctor may suggest a different range of weight gain.) A sudden weight gain may mean that your heart failure is getting worse. · Keep a daily record of your symptoms. Write down any changes in how you feel, such as new shortness of breath, cough, or problems eating. Also record if your ankles are more swollen than usual and if you feel more tired than usual. Note anything that you ate or did that could have triggered these changes. Limit sodium  Sodium causes your body to hold on to extra water. This may cause your heart failure symptoms to get worse. People get most of their sodium from processed foods. Fast food and restaurant meals also tend to be very high in sodium. · Your doctor may suggest that you limit sodium. Your doctor can tell you how much sodium is right for you. This includes limiting sodium in cooked and packaged foods. · Read food labels on cans and food packages. They tell you how much sodium you get in one serving. Check the serving size. If you eat more than one serving, you are getting more sodium. · Be aware that sodium can come in forms other than salt, including monosodium glutamate (MSG), sodium citrate, and sodium bicarbonate (baking soda). MSG is often added to Asian food.  You can sometimes ask for food without MSG or salt. · Slowly reducing salt will help you adjust to the taste. Take the salt shaker off the table. · Flavor your food with garlic, lemon juice, onion, vinegar, herbs, and spices instead of salt. Do not use soy sauce, steak sauce, onion salt, garlic salt, mustard, or ketchup on your food, unless it is labeled \"low-sodium\" or \"low-salt. \"  · Make your own salad dressings, sauces, and ketchup without adding salt. · Use fresh or frozen ingredients, instead of canned ones, whenever you can. Choose low-sodium canned goods. · Eat less processed food and food from restaurants, including fast food. Exercise as directed  Moderate, regular exercise is very good for your heart. It improves your blood flow and helps control your weight. But too much exercise can stress your heart and cause a heart failure flare-up. · Check with your doctor before you start an exercise program.  · Walking is an easy way to get exercise. Start out slowly. Gradually increase the length and pace of your walk. Swimming, riding a bike, and using a treadmill are also good forms of exercise. · When you exercise, watch for signs that your heart is working too hard. You are pushing yourself too hard if you cannot talk while you are exercising. If you become short of breath or dizzy or have chest pain, stop, sit down, and rest.  · Do not exercise when you do not feel well. Take medicines correctly  · Take your medicines exactly as prescribed. Call your doctor if you think you are having a problem with your medicine. · Make a list of all the medicines you take. Include those prescribed to you by other doctors and any over-the-counter medicines, vitamins, or supplements you take. Take this list with you when you go to any doctor. · Take your medicines at the same time every day. It may help you to post a list of all the medicines you take every day and what time of day you take them. · Make taking your medicine as simple as you can.  Plan times to take your medicines when you are doing other things, such as eating a meal or getting ready for bed. This will make it easier to remember to take your medicines. · Get organized. Use helpful tools, such as daily or weekly pill containers. When should you call for help? Call 911 if you have symptoms of sudden heart failure such as:    · You have severe trouble breathing.     · You cough up pink, foamy mucus.     · You have a new irregular or rapid heartbeat. Call your doctor now or seek immediate medical care if:    · You have new or increased shortness of breath.     · You are dizzy or lightheaded, or you feel like you may faint.     · You have sudden weight gain, such as more than 2 to 3 pounds in a day or 5 pounds in a week. (Your doctor may suggest a different range of weight gain.)     · You have increased swelling in your legs, ankles, or feet.     · You are suddenly so tired or weak that you cannot do your usual activities. Watch closely for changes in your health, and be sure to contact your doctor if you develop new symptoms. Where can you learn more? Go to http://www.gray.com/  Enter V089 in the search box to learn more about \"Avoiding Triggers With Heart Failure: Care Instructions. \"  Current as of: December 16, 2019               Content Version: 12.6  © 2301-8306 Healthwise, Incorporated. Care instructions adapted under license by [employer] (which disclaims liability or warranty for this information). If you have questions about a medical condition or this instruction, always ask your healthcare professional. [place] [address] any warranty or liability for your use of this information. Patient Education   Torsemide (By mouth)   Torsemide (TORE-se-mide)  Treats fluid retention (edema) caused by heart failure, kidney disease, or liver disease. Also treats high blood pressure. This medicine is a diuretic (water pill).    Brand Name(s): Demadex   There may be other brand names for this medicine. When This Medicine Should Not Be Used: This medicine is not right for everyone. Do not use it if you had an allergic reaction to torsemide or povidone. How to Use This Medicine:   Tablet  · Take your medicine as directed. Your dose may need to be changed several times to find what works best for you. · Carefully follow your doctor's instructions about any special diet. You may need to eat foods that are high in potassium (such as oranges or bananas) to prevent potassium loss while you are using this medicine. · Missed food: Take a dose as soon as you remember. If it is almost time for your next dose, wait until then and take a regular dose. Do not take extra medicine to make up for a missed dose. · Store the medicine in a closed container at room temperature, away from heat, moisture, and direct light. Drugs and Foods to Avoid:   Ask your doctor or pharmacist before using any other medicine, including over-the-counter medicines, vitamins, and herbal products. · Some medicines can affect how torsemide works. Tell your doctor if you are using any of the following:  ¨ Amiodarone, cisplatin, digoxin, ethacrynic acid, fluconazole, lithium, miconazole, oxandrolone, phenytoin, probenecid, rifampin, or warfarin  ¨ Aminoglycoside antibiotic (including amikacin, gentamicin, streptomycin)  ¨ Blood pressure medicine  ¨ NSAID pain or arthritis medicine (including aspirin, celecoxib, diclofenac, ibuprofen, indomethacin, naproxen)  . · If you are taking cholestyramine, take it at least 1 hour after or 4 to 6 hours before taking torsemide. Warnings While Using This Medicine:   · Tell your doctor if you are pregnant or breastfeeding, or if you have kidney disease, liver disease, dehydration, diabetes, gout, heart disease, hearing problems, or had an allergic reaction to any sulfa drugs. Tell your doctor if you are on a low-salt diet.   · This medicine may make you dizzy or drowsy. Do not drive or do anything else that could be dangerous until you know how this medicine affects you. Sit or lie down if you feel dizzy. Stand up carefully. · Make sure any doctor or dentist who treats you knows that you are using this medicine. · Your doctor will do lab tests at regular visits to check on the effects of this medicine. Keep all appointments. · Keep all medicine out of the reach of children. Never share your medicine with anyone. Possible Side Effects While Using This Medicine:   Call your doctor right away if you notice any of these side effects:  · Allergic reaction: Itching or hives, swelling in your face or hands, swelling or tingling in your mouth or throat, chest tightness, trouble breathing  · Blistering, peeling, red skin rash  · Chest pain, fast or uneven heartbeat  · Dry mouth, increased thirst, muscle cramps, problems urinating, nausea, or vomiting  · Increase in how much or how often you urinate  · Lightheadedness, dizziness, fainting  · Hearing loss, or ringing in your ears  If you notice other side effects that you think are caused by this medicine, tell your doctor. Call your doctor for medical advice about side effects. You may report side effects to FDA at 0-086-FDA-8482  © 2017 [address] Information is for End User's use only and may not be sold, redistributed or otherwise used for commercial purposes. The above information is an  only. It is not intended as medical advice for individual conditions or treatments. Talk to your doctor, nurse or pharmacist before following any medical regimen to see if it is safe and effective for you.

## 2020-10-29 NOTE — ROUTINE PROCESS
Bedside and Verbal shift change report given to [place], [facility] (oncoming nurse) by myself (offgoing nurse). Report included the following information SBAR, Kardex, MAR and Recent Results.

## 2020-10-29 NOTE — PROGRESS NOTES
Care Management Interventions  PCP Verified by CM: Yes(Dr Magalys Leggett)  Last Visit to PCP: 10/22/20  Mode of Transport at Discharge: Other (see comment)(Edgardo Silva    [phone]  )  Transition of Care Consult (CM Consult): Discharge Planning, Other(OP PT resume)  Discharge Durable Medical Equipment: No  Physical Therapy Consult: Yes  Occupational Therapy Consult: No  Current Support Network: Lives Alone  Confirm Follow Up Transport: Friends  The Plan for Transition of Care is Related to the Following Treatment Goals : Outpatient PT  The Patient and/or Patient Representative was Provided with a Choice of Provider and Agrees with the Discharge Plan?: Yes  Name of the Patient Representative Who was Provided with a Choice of Provider and Agrees with the Discharge Plan: Patient  Freedom of Choice List was Provided with Basic Dialogue that Supports the Patient's Individualized Plan of Care/Goals, Treatment Preferences and Shares the Quality Data Associated with the Providers?: Yes  Discharge Location  Discharge Placement: Home with outpatient services    CM met with pt to discuss her discharge. She requests CM to notify [address] [place] [address] for her CLTC. CM notified pt's Javier Ivan of pt's discharge by . Pt states she will be in touch with [facility] to continue OP PT. No other CM needs noted.

## 2020-10-29 NOTE — PROGRESS NOTES
Discharge instructions given to patient and friend, pt verbalized understanding of all instructions.

## 2020-10-29 NOTE — PROGRESS NOTES
Problem: Mobility Impaired (Adult and Pediatric)  Goal: *Acute Goals and Plan of Care (Insert Text)  Description:   LTG:  (1.)Ms. Zhang will move from supine to sit and sit to supine , scoot up and down, and roll side to side in bed with INDEPENDENT within 7 treatment day(s). (2.)Ms. Zhang will transfer from bed to chair and chair to bed with MODIFIED INDEPENDENCE using the least restrictive device within 7 treatment day(s). (3.)Ms. Zhang will ambulate with MODIFIED INDEPENDENCE for 500 feet with the least restrictive device within 7 treatment day(s). (4.)Ms. Zhang will tolerate at least 15 min of dynamic standing activity to assist standing ADLs with the least restrictive device within 7 treatment days. (5.)Ms. Zhang will climb at least 1 steps with MODIFIED INDEPENDENCE with the least restrictive device within 7 treatment days. ________________________________________________________________________________________________          PHYSICAL THERAPY: Initial Assessment, Daily Note, and PM 10/29/2020  INPATIENT: PT Visit Days : 1  [place] / [place] / Product Type: Medicare /       NAME/AGE/GENDER: Jose Cruz Malagon is a 72 y.o. female   PRIMARY DIAGNOSIS: SBO (small bowel obstruction) ([facility] [K56.609] SBO (small bowel obstruction) ([facility]) SBO (small bowel obstruction) ([facility]        ICD-10: Treatment Diagnosis:    Generalized Muscle Weakness (M62.81)  Other lack of cordination (R27.8)   Precaution/Allergies:  Latex; Tetanus and diphther. tox (pf); Doxycycline; Fentanyl; Morphine; Penicillins; and Phenergan [promethazine]      ASSESSMENT:     Ms. Tolu Graham is a 72year old F who presents SBO. Prior to hospital admission pt lives with no one in 1 story home with 1 step(s) to enter. Pt endorses 8 falls in past 6 months due to seizures. Prior to admission Ms. Zhang uses a RW for mobility. Upon entering, pt supine, agreeable to PT evaluation.   she reports 6/10 pain in her abdomen at rest. BLE assessment indicates sensation to light touch reduced in L LE, AROM WFL, and strength 4-/5 in BLE. Pt performed supine > sit with SBA, sitting EOB with good static sitting balance control. Sit > stand with SBA using RW. Gait x 250 ft with RW, cues for posture and step clearance. At end of session pt supine in bed with all needs within reach, alarm activated for safety, RN notified. Pt presents as functioning below her baseline, with deficits in mobility including transfers, gait, balance, and activity tolerance. Pt will benefit from skilled therapy services to address stated deficits to promote return to highest level of function, independence, and safety. Will continue to follow. This section established at most recent assessment   PROBLEM LIST (Impairments causing functional limitations):  Decreased Strength  Decreased ADL/Functional Activities  Decreased Transfer Abilities  Decreased Ambulation Ability/Technique  Decreased Balance  Increased Pain  Decreased Activity Tolerance   INTERVENTIONS PLANNED: (Benefits and precautions of physical therapy have been discussed with the patient.)  Balance Exercise  Bed Mobility  Family Education  Gait Training  Manual Therapy  Neuromuscular Re-education/Strengthening  Range of Motion (ROM)  Therapeutic Activites  Therapeutic Exercise/Strengthening  Transfer Training     TREATMENT PLAN: Frequency/Duration: 3 times a week for duration of hospital stay  Rehabilitation Potential For Stated Goals: Excellent     REHAB RECOMMENDATIONS (at time of discharge pending progress):    Placement: It is my opinion, based on this patient's performance to date, that Ms. Zhang may benefit from OUTPATIENT THERAPY after discharge due to the functional deficits listed above that are likely to improve with skilled rehabilitation because because he/she is able to safely attend regular and reoccurring therapy sessions at an outpatient facility. (Resume current outpatient therapy)  Equipment:   None at this time              HISTORY:   History of Present Injury/Illness (Reason for Referral):  See H&P  Past Medical History/Comorbidities:   Ms. Jose Clark  has no past medical history on file. Ms. Jose Clark  has no past surgical history on file. Social History/Living Environment:   Home Environment: Private residence  One/Two Story Residence: One story  Living Alone: Yes  Support Systems: Family member(s), Friends \ neighbors  Patient Expects to be Discharged to[de-identified] Private residence  Current DME Used/Available at Home: Gisella Maddox, straight, Walker, rollator  Prior Level of Function/Work/Activity:  Community ambulator w/ RW, frequent falls due to seizures     Number of Personal Factors/Comorbidities that affect the Plan of Care: 1-2: MODERATE COMPLEXITY   EXAMINATION:   Most Recent Physical Functioning:   Gross Assessment:  AROM: Within functional limits  PROM: Within functional limits  Strength: Generally decreased, functional  Coordination: Generally decreased, functional               Posture:     Balance:  Sitting: Intact  Standing: Impaired  Standing - Static: Good  Standing - Dynamic : Good;Fair Bed Mobility:  Supine to Sit: Stand-by assistance  Scooting: Stand-by assistance  Wheelchair Mobility:     Transfers:  Sit to Stand: Stand-by assistance  Stand to Sit: Stand-by assistance  Gait:     Base of Support: Widened  Speed/Lizeth: Slow  Step Length: Right shortened;Left shortened  Stance: Time  Gait Abnormalities: Trunk sway increased;Decreased step clearance  Distance (ft): 250 Feet (ft)  Assistive Device: Walker, rolling;Gait belt  Ambulation - Level of Assistance: Stand-by assistance  Interventions: Manual cues; Safety awareness training;Verbal cues; Visual/Demos; Tactile cues      Body Structures Involved:  Bones  Joints  Muscles Body Functions Affected:  Neuromusculoskeletal  Movement Related  Skin Related Activities and Participation Affected:  Learning and Applying Knowledge  General Tasks and Demands  Mobility  Self Care  Interpersonal Interactions and Relationships   Number of elements that affect the Plan of Care: 3: MODERATE COMPLEXITY   CLINICAL PRESENTATION:   Presentation: Stable and uncomplicated: LOW COMPLEXITY   CLINICAL DECISION MAKIN [facility] [address] [id number] AM-PAC 6 Clicks   Basic Mobility Inpatient Short Form  How much difficulty does the patient currently have. .. Unable A Lot A Little None   1. Turning over in bed (including adjusting bedclothes, sheets and blankets)? [] 1   [] 2   [] 3   [x] 4   2. Sitting down on and standing up from a chair with arms ( e.g., wheelchair, bedside commode, etc.)   [] 1   [] 2   [x] 3   [] 4   3. Moving from lying on back to sitting on the side of the bed? [] 1   [] 2   [x] 3   [] 4   How much help from another person does the patient currently need. .. Total A Lot A Little None   4. Moving to and from a bed to a chair (including a wheelchair)? [] 1   [] 2   [x] 3   [] 4   5. Need to walk in hospital room? [] 1   [] 2   [x] 3   [] 4   6. Climbing 3-5 steps with a railing? [] 1   [] 2   [x] 3   [] 4   © , Trustees of [address] [place] [address] [address] [address], under license to [employer]. All rights reserved      Score:  Initial: 19 Most Recent: X (Date: -- )    Interpretation of Tool:  Represents activities that are increasingly more difficult (i.e. Bed mobility, Transfers, Gait). Medical Necessity:     Skilled intervention continues to be required due to decreased functional mobility.   Reason for Services/Other Comments:     Use of outcome tool(s) and clinical judgement create a POC that gives a: Clear prediction of patient's progress: LOW COMPLEXITY            TREATMENT:   (In addition to Assessment/Re-Assessment sessions the following treatments were rendered)   Pre-treatment Symptoms/Complaints:  \"I go to therapy when I'm at home\"  Pain: Initial:   Pain Intensity 1: 6  Post Session:  6     Therapeutic Exercise: (9 Minutes):  Level ground ambulation to improve mobility, strength, balance, and coordination. Required minimal visual, verbal, manual, and tactile cues to promote proper body alignment, promote proper body posture, promote proper body mechanics, and promote proper body breathing techniques. Progressed  distance  as indicated. Braces/Orthotics/Lines/Etc:   O2 Device: Room air  Treatment/Session Assessment:    Response to Treatment:  mild fatigue  Interdisciplinary Collaboration:   Physical Therapist  Registered Nurse  After treatment position/precautions:   Supine in bed  Bed/Chair-wheels locked  Bed in low position  Call light within reach  RN notified   Compliance with Program/Exercises: Will assess as treatment progresses  Recommendations/Intent for next treatment session: \"Next visit will focus on advancements to more challenging activities\".   Total Treatment Duration:  PT Patient Time In/Time Out  Time In: 1350  Time Out: [id number] [employer] Elissaigor Whitaker

## 2020-10-29 NOTE — DISCHARGE SUMMARY
Hospitalist Discharge Summary     Admit Date:  10/26/2020  9:22 PM   Name:  Bhavani Claros   Age:  72 y.o.  :  1955   MRN:  [id number]   PCP:  Katia Martinez MD  Treatment Team: Attending Provider: Prabha Westbrook MD; Care Manager: Telma Slater, RN; Utilization Review: Sandro Conteh; Physical Therapist: Verneice Riding    Problem List for this Hospitalization:  Hospital Problems as of 10/29/2020 Never Reviewed          Codes Class Noted - Resolved POA    * (Principal) SBO (small bowel obstruction) ([facility] ICD-10-CM: W21.318  ICD-9-CM: 560.9  10/27/2020 - Present Unknown        Anxiety and depression ICD-10-CM: F41.9, F32.9  ICD-9-CM: 300.00, 311  10/27/2020 - Present Yes        History of gastric bypass ICD-10-CM: Z98.84  ICD-9-CM: V45.86  10/27/2020 - Present Yes    Overview Signed 10/27/2020  3:19 AM by Kenji Mansfield MD     Eden-en- y             Chronic diastolic congestive heart failure ([facility] ICD-10-CM: I50.32  ICD-9-CM: 428.32, 428.0  3/18/2017 - Present Yes    Overview Signed 10/27/2020  3:19 AM by Kenji Mansfield MD     Echo : EF 60-65%, G2 LVDD, no significant valvular disease    Last Assessment & Plan:   Patient with recent increase in BLE edema prompting evaluation by PCP. She had 7# weight loss and complete resolution of edema on 20mg Torsemide x 1 week. Started on every other day dosing and has noted slow increase in weight and BLE edema. She also notes some dizziness which has been unchanged since last visit. Had noted a low BP (80s/60s) when working with home PT, but has never had that low of pressure in the office. Lab work recently collected and unremarkable.   - Continue Coreg  - Increase Torsemide to alternate 20mg and 10mg every other day  - Requested patient to take BP 3 x weekly   - F/U in 6 weeks             Essential hypertension ICD-10-CM: I10  ICD-9-CM: 401.9  2015 - Present Yes    Overview Signed 10/27/2020  3:19 AM by Kenji Mansfield MD     Last Assessment & Plan:   D/w pt and there are multiple medications that could be contributing to her feeling lightheaded or dizzy. Her BP is on the lower side of normal and will go ahead and decrease her diuretic per the cariology note dated 10/7. She agrees to the following regimen:  1. Decrease Torsemide (from 20 mg every other day) to 10 mg every other day (decrease KCl 20 mEq [from daily] to every other day). May take an additional dose of 10 mg on one day of the week for increased peripheral edema or wt gain. 2. Continue Coreg 3.125 mg BID             Acquired hypothyroidism ICD-10-CM: E03.9  ICD-9-CM: 244.9  8/11/2015 - Present Yes    Overview Signed 10/27/2020  3:19 AM by Marielle Breaux MD     Labile hypothyroidism due to absorption issues s/p gastric bypass. Patient reported adverse reaction to Synthroid. Stable on Levoxyl. Last Assessment & Plan:   Labile hypothyroidism due to absorption issues s/p gastric bypass. Currently stable on Levoxyl 137 mcg. Repeat TSH and free T4 pending. Admission HPI from 10/26/2020:      Patient is a 69yoF with PMH significant for gastric bypass surgery, HLD, HTN who presents with nausea, vomiting, and diarrhea. Patient reports that this started yesterday. She states that she has tried zofran, but it did not help. She also stated that she began having abdominal pain around the onset of her N/V/D. She states that she still feels nauseated, but has not had any diarrhea since arriving to the hospital.  On ER workup, patient was found to have evidence of a SBO on CT, possibly involving the afferent loop of Eden-en-Y. General Surgery consulted. Recommended Hospitalist admission.     Complete ROS done and is as stated in HPI or otherwise negative    Hospital Course:    Small bowel obstruction present on admission resolved  Hypertension  Hypothyroidism  Diastolic congestive heart failure    Patient was admitted for small bowel obstruction.   General surgery was consulted. CT scan of the abdomen and pelvis done on admission was concerning for small bowel obstruction. She had oral contrast for the CT scan and repeat KUB abdomen showed that the contrast was seen in the right and proximal to mid transverse colon with no significant retention within the dilated proximal small bowel loop. Patient was initially started on clear liquid diet and subsequently advanced as tolerated. Patient did not have any nausea vomiting or abdominal pain with soft diet. Dietary options were discussed with the patient. Other chronic medical problems which are stable included hypertension, hypothyroidism, diastolic CHF. She is discharged home with home health services today in a stable condition to follow-up with primary care physician in 3 to 5 days. Disposition: Home Health Care Svc  Activity: as tolerated  Diet: DIET GI SOFT No options chosen    Follow up instructions, discharge meds at bottom of this note. Plan was discussed with the pt. All questions answered. Patient was stable at time of discharge. Patient will call a physician or return if any concerns. Diagnostic Imaging/Tests:   Xr Abd (kub)    Result Date: 10/27/2020  KUB 10/27/2020 CLINICAL HISTORY: Dilated afferent loop. COMPARISON: CT abdomen 10/27/2020 FINDINGS: Postsurgical changes are seen with multiple suture lines and staples in the left and right upper abdomen. Density is seen in the central bilateral abdomen which appears to represent excreted contrast within the renal collecting systems from the patient's recent CT scan. Previously administered oral contrast is now seen within the right and proximal to mid transverse colon. No clearly demonstrated retained contrast within the proximal small bowel loop is seen. Assessment for free air is limited although no obvious free air is seen. The patient is status post single level fusion in the lower lumbar spine. IMPRESSION: 1.  Nonspecific bowel gas pattern. Visualized oral contrast now resides within the right and proximal to mid transverse colon. No significant retention within the dilated proximal small bowel loop is seen. Ct Abd Pelv W Cont    Result Date: 10/27/2020  EXAM: CT abdomen and pelvis with IV contrast. INDICATION: Abdominal pain and vomiting. COMPARISON: None. TECHNIQUE: Axial CT images of the abdomen and pelvis were obtained after the intravenous injection of 100 mL Isovue 370 CT contrast. Oral contrast was administered as well. Radiation dose reduction techniques were used for this study. Our CT scanners use one or all of the following:  Automated exposure control, adjustment of the mA or kV according to patient size, iterative reconstruction. FINDINGS: - Liver: Within normal limits. - Gallbladder and bile ducts: Postcholecystectomy. - Spleen: Within normal limits. - Urinary tract: Nonobstructing 7 mm right kidney stone. The urinary bladder and left kidney are unremarkable. - Adrenals: Within normal limits. - Pancreas: Within normal limits. - Gastrointestinal tract: There is altered bowel anatomy with multiple postsurgical clips in the upper abdomen. There is a distended small bowel loop in the right upper quadrant extending towards the liver hilum, measuring 5 cm in diameter and containing oral contrast, possibly an obstructed afferent loop of a Eden-en-Y. The stomach and more distal small bowel loops are normal in caliber, as is the colon. The cause of the dilatation is not clearly identified. No discrete bowel mass or wall thickening is seen. - Retroperitoneum: Mild abdominal aortic atherosclerosis. - Peritoneal cavity and abdominal wall: No ascites or free intraperitoneal air. - Pelvis: Post hysterectomy. - Spine/bones: No acute process. There is fusion hardware at the lumbosacral junction. - Other comments: None.      IMPRESSION: There is a dilated bowel loop in the right upper quadrant, containing oral contrast, which extends towards the liver hilum, possibly an obstructed afferent loop of a Eden-en-Y. Echocardiogram results:  No results found for this visit on 10/26/20. Procedures done this admission:  * No surgery found *    All Micro Results     None          Labs: Results:       BMP, Mg, Phos Recent Labs     10/29/20  0357 10/28/20  0453 10/27/20  0920    146* 144   K 3.1* 3.9 3.2*   * 119* 113*   CO2 21 20* 22   AGAP 8 7 9   BUN 10 13 21   CREA 0.85 0.81 1.08*   CA 8.4 8.2* 8.5   GLU 82 83 85      CBC Recent Labs     10/29/20  0357 10/28/20  0453 10/27/20  0920 10/26/20  1918   WBC 7.2 11.5* 8.9 18.3*   RBC 3.67* 3.75* 4.32 5.50*   HGB 10.5* 11.0* 12.7 15.9*   HCT 32.3* 33.4* 37.1 45.9    112* 192 328   GRANS  --   --  61 78   LYMPH  --   --  27 14   EOS  --   --  1 0*   MONOS  --   --  9 7   BASOS  --   --  1 0   IG  --   --  1 1   ANEU  --   --  5.4 14.2*   ABL  --   --  2.4 2.6   JUVE  --   --  0.1 0.0   ABM  --   --  0.8 1.3   ABB  --   --  0.1 0.1   AIG  --   --  0.1 0.1      LFT Recent Labs     10/27/20  0920 10/26/20  1918   ALT 25 27   * 232*   TP 6.0* 7.9   ALB 2.9* 3.7   GLOB 3.1 4.2*   AGRAT 0.9* 0.9*      Cardiac Testing No results found for: BNPP, BNP, CPK, RCK1, RCK2, RCK3, RCK4, CKMB, CKNDX, CKND1, TROPT, TROIQ   Coagulation Tests No results found for: PTP, INR, APTT, INREXT   A1c No results found for: HBA1C, HGBE8, RMV9TCBZ   Lipid Panel No results found for: CHOL, CHOLPOCT, CHOLX, CHLST, CHOLV, 689752, HDL, HDLP, LDL, LDLC, DLDLP, 777346, VLDLC, VLDL, TGLX, TRIGL, TRIGP, TGLPOCT, CHHD, CHHDX   Thyroid Panel No results found for: TSH, T4, FT4, TT3, T3U, TSHEXT     Most Recent UA No results found for: COLOR, APPRN, REFSG, EAN, PROTU, GLUCU, KETU, BILU, BLDU, UROU, KARIN, LEUKU, WBCU, RBCU, UEPI, BACTU, CASTS, UCRY, MUCUS, UCOM     Allergies   Allergen Reactions    Latex Rash    Tetanus And Diphther.  Tox (Pf) Anaphylaxis    Doxycycline Nausea and Vomiting    Fentanyl Unknown (comments) Pt had prior surgery and was told not take it again      Morphine Hives    Penicillins Other (comments)    Phenergan [Promethazine] Other (comments)     Pt stated her neurologist told her not to take it       There is no immunization history on file for this patient.     All Labs from Last 24 Hrs:  Recent Results (from the past 24 hour(s))   METABOLIC PANEL, BASIC    Collection Time: 10/29/20  3:57 AM   Result Value Ref Range    Sodium 145 138 - 145 mmol/L    Potassium 3.1 (L) 3.5 - 5.1 mmol/L    Chloride 116 (H) 98 - 107 mmol/L    CO2 21 21 - 32 mmol/L    Anion gap 8 7 - 16 mmol/L    Glucose 82 65 - 100 mg/dL    BUN 10 8 - 23 MG/DL    Creatinine 0.85 0.6 - 1.0 MG/DL    GFR est AA >60 >60 ml/min/1.73m2    GFR est non-AA >60 >60 ml/min/1.73m2    Calcium 8.4 8.3 - 10.4 MG/DL   CBC W/O DIFF    Collection Time: 10/29/20  3:57 AM   Result Value Ref Range    WBC 7.2 4.3 - 11.1 K/uL    RBC 3.67 (L) 4.05 - 5.2 M/uL    HGB 10.5 (L) 11.7 - 15.4 g/dL    HCT 32.3 (L) 35.8 - 46.3 %    MCV 88.0 79.6 - 97.8 FL    MCH 28.6 26.1 - 32.9 PG    MCHC 32.5 31.4 - 35.0 g/dL    RDW 13.8 11.9 - 14.6 %    PLATELET 108 011 - 187 K/uL    MPV 9.0 (L) 9.4 - 12.3 FL    ABSOLUTE NRBC 0.00 0.0 - 0.2 K/uL       Current Med List in Hospital:   Current Facility-Administered Medications   Medication Dose Route Frequency    trihexyphenidyL (ARTANE) tablet 2 mg  2 mg Oral TID    atorvastatin (LIPITOR) tablet 80 mg  80 mg Oral QHS    carvediloL (COREG) tablet 3.125 mg  3.125 mg Oral BID    dicyclomine (BENTYL) tablet 20 mg  20 mg Oral QID PRN    DULoxetine (CYMBALTA) capsule 60 mg  60 mg Oral BID    lacosamide (VIMPAT) tablet 200 mg  200 mg Oral Q12H    loratadine (CLARITIN) tablet 5 mg  5 mg Oral QHS    levothyroxine (SYNTHROID) tablet 125 mcg  125 mcg Oral DAILY    mesalamine DR (ASACOL HD) tablet 1,600 mg  1,600 mg Oral TID    pantoprazole (PROTONIX) tablet 40 mg  40 mg Oral ACB&D    pregabalin (LYRICA) capsule 100 mg  100 mg Oral Q12H    ketorolac (TORADOL) injection 15 mg  15 mg IntraVENous Q6H PRN    zonisamide (ZONEGRAN) capsule 100 mg  100 mg Oral DAILY    zonisamide (ZONEGRAN) capsule 300 mg  300 mg Oral QPM    sodium chloride (NS) flush 5-40 mL  5-40 mL IntraVENous Q8H    sodium chloride (NS) flush 5-40 mL  5-40 mL IntraVENous PRN    acetaminophen (TYLENOL) tablet 650 mg  650 mg Oral Q6H PRN    Or    acetaminophen (TYLENOL) suppository 650 mg  650 mg Rectal Q6H PRN    promethazine (PHENERGAN) tablet 12.5 mg  12.5 mg Oral Q6H PRN    Or    ondansetron (ZOFRAN) injection 4 mg  4 mg IntraVENous Q6H PRN    enoxaparin (LOVENOX) injection 40 mg  40 mg SubCUTAneous DAILY    lip protectant (BLISTEX) ointment 1 Each  1 Each Topical PRN    bisacodyL (DULCOLAX) suppository 10 mg  10 mg Rectal DAILY    HYDROcodone-acetaminophen (NORCO) 5-325 mg per tablet 1 Tab  1 Tab Oral Q6H PRN       Discharge Exam:  Patient Vitals for the past 24 hrs:   Temp Pulse Resp BP SpO2   10/29/20 0858  87  (!) 108/57    10/29/20 0846 97.7 °F (36.5 °C) 71 16 129/80 96 %   10/29/20 0608 97.7 °F (36.5 °C) 61 16 (!) 118/59 95 %   10/29/20 0033 97.9 °F (36.6 °C) 65 16 (!) 108/54 96 %   10/28/20 2107 97.5 °F (36.4 °C) (!) 56 16 127/61 98 %   10/28/20 1633 98.7 °F (37.1 °C) (!) 59 14 129/61 100 %     Oxygen Therapy  O2 Sat (%): 96 % (10/29/20 0846)  Pulse via Oximetry: 91 beats per minute (10/27/20 0328)  O2 Device: Room air (10/29/20 8273)    Intake/Output Summary (Last 24 hours) at 10/29/2020 1318  Last data filed at 10/28/2020 1826  Gross per 24 hour   Intake 2240 ml   Output    Net 2240 ml       *Note that automatically entered I/Os may not be accurate; dependent on patient compliance with collection and accurate  by assistants. General:    Well nourished. Alert. Eyes:   Normal sclerae. Extraocular movements intact. ENT:  Normocephalic, atraumatic. Moist mucous membranes  CV:   Regular rate and rhythm. No murmur, rub, or gallop.     Lungs:  Clear to auscultation bilaterally. No wheezing, rhonchi, or rales. Abdomen: Soft, nontender, nondistended. Bowel sounds active, no organomegaly, abdominal scars from prior surgeries. Extremities: Warm and dry. No cyanosis or edema. Neurologic: CN II-XII grossly intact. Sensation intact. Skin:     No rashes or jaundice. Psych:  Normal mood and affect. Discharge Info:   Current Discharge Medication List      START taking these medications    Details   senna-docusate (PERICOLACE) 8.6-50 mg per tablet Take 1 Tab by mouth daily for 15 days. Qty: 15 Tab, Refills: 0         CONTINUE these medications which have CHANGED    Details   torsemide (DEMADEX) 10 mg tablet Take 1 Tab by mouth every Monday, Wednesday, Friday for 30 days. Qty: 12 Tab, Refills: 0         CONTINUE these medications which have NOT CHANGED    Details   atorvastatin (LIPITOR) 80 mg tablet Take 80 mg by mouth. clopidogreL (PLAVIX) 75 mg tab Take 75 mg by mouth daily. carvediloL (COREG) 3.125 mg tablet Take 3.125 mg by mouth two (2) times a day. !! deutetrabenazine 9 mg tab Take 2 Tabs by mouth. !! deutetrabenazine 6 mg tab Take 2 Tabs by mouth Every morning. dicyclomine (BENTYL) 20 mg tablet Take 20 mg by mouth four (4) times daily as needed. DULoxetine (CYMBALTA) 60 mg capsule Take 60 mg by mouth two (2) times a day.      hydrOXYzine HCL (ATARAX) 25 mg tablet Take 50 mg by mouth three (3) times daily as needed. lacosamide (VIMPAT) 200 mg tab tablet Take 200 mg by mouth two (2) times a day. levocetirizine (XYZAL) 5 mg tablet Take 2.5 mg by mouth nightly. mesalamine (LIALDA) 1.2 gram delayed release tablet Take 4.8 g by mouth daily. omeprazole (PRILOSEC) 20 mg capsule Take 20 mg by mouth two (2) times a day. ondansetron (ZOFRAN ODT) 8 mg disintegrating tablet Take 8 mg by mouth three (3) times daily as needed.       oxyCODONE IR (ROXICODONE) 5 mg immediate release tablet Take 5 mg by mouth every six (6) hours as needed. potassium chloride (K-DUR, KLOR-CON) 20 mEq tablet Take 20 mEq by mouth every other day. pregabalin (LYRICA) 100 mg capsule Take 100 mg by mouth two (2) times a day. trihexyphenidyL (ARTANE) 2 mg tablet Take 2 mg by mouth three (3) times daily. zonisamide (ZONEGRAN) 100 mg capsule Take 100 mg by mouth two (2) times a day. 100mg Capsule in the AM 300mg in the PM      Blood Pressure Monitor kit 1 Each by Not Applicable route daily. colestipoL (COLESTID) 5 gram packet Take 5 g by mouth three (3) times daily as needed. diclofenac (VOLTAREN) 1 % gel Apply 1-2 g to affected area. levothyroxine (SYNTHROID) 125 mcg tablet Take 125 mcg by mouth daily. lifitegrast 5 % dpet Administer 1 Drop to both eyes two (2) times a day. cholecalciferol (VITAMIN D3) (5000 Units/125 mcg) tab tablet Take 5,000 Units by mouth Every morning. !! - Potential duplicate medications found. Please discuss with provider. Follow Up Orders: Follow-up Appointments   Procedures    FOLLOW UP VISIT Appointment in: 3 - 5 Days F/u with PCP in 3-5 days     F/u with PCP in 3-5 days     Standing Status:   Standing     Number of Occurrences:   1     Order Specific Question:   Appointment in     Answer:   3 - 5 Days       Follow-up Information     Follow up With Specialties Details Why Contact Info    Other, MD Obi    Patient can only remember the practice name and not the physician            Time spent in patient discharge planning and coordination 41 minutes.     Signed:  Bonny Frankel MD

## 2020-10-30 ENCOUNTER — PATIENT OUTREACH (OUTPATIENT)
Dept: CASE MANAGEMENT | Age: 65
End: 2020-10-30

## 2020-10-30 NOTE — PROGRESS NOTES
Second [facility] outreach attempt without success. Unable to leave message. Unable to reach for [facility] program.  Will reopen if call is returned.

## 2020-10-30 NOTE — PROGRESS NOTES
Initial SITA outreach attempt to was unsuccessful. Unable to leave  message. Will attempt second outreach within 24 hours.

## 2022-03-19 PROBLEM — Z98.84 HISTORY OF GASTRIC BYPASS: Status: ACTIVE | Noted: 2020-10-27

## 2022-03-19 PROBLEM — K56.609 SBO (SMALL BOWEL OBSTRUCTION) (HCC): Status: ACTIVE | Noted: 2020-10-27

## 2022-03-20 PROBLEM — F32.A ANXIETY AND DEPRESSION: Status: ACTIVE | Noted: 2020-10-27

## 2022-03-20 PROBLEM — F41.9 ANXIETY AND DEPRESSION: Status: ACTIVE | Noted: 2020-10-27

## 2022-03-20 PROBLEM — I50.32 CHRONIC DIASTOLIC CONGESTIVE HEART FAILURE (HCC): Status: ACTIVE | Noted: 2017-03-18

## 2023-05-15 RX ORDER — TRIHEXYPHENIDYL HYDROCHLORIDE 2 MG/1
2 TABLET ORAL 3 TIMES DAILY
COMMUNITY
Start: 2020-10-16

## 2023-05-15 RX ORDER — ATORVASTATIN CALCIUM 80 MG/1
80 TABLET, FILM COATED ORAL
COMMUNITY
Start: 2020-05-13

## 2023-05-15 RX ORDER — PREGABALIN 100 MG/1
100 CAPSULE ORAL 2 TIMES DAILY
COMMUNITY
Start: 2020-10-16

## 2023-05-15 RX ORDER — CARVEDILOL 3.12 MG/1
3.12 TABLET ORAL 2 TIMES DAILY
COMMUNITY
Start: 2020-10-10

## 2023-05-15 RX ORDER — HYDROXYZINE HYDROCHLORIDE 25 MG/1
50 TABLET, FILM COATED ORAL 3 TIMES DAILY PRN
COMMUNITY
Start: 2020-09-23

## 2023-05-15 RX ORDER — COLESTIPOL HYDROCHLORIDE 5 G/5G
5 GRANULE, FOR SUSPENSION ORAL 3 TIMES DAILY PRN
COMMUNITY

## 2023-05-15 RX ORDER — CLOPIDOGREL BISULFATE 75 MG/1
75 TABLET ORAL DAILY
COMMUNITY
Start: 2020-06-19

## 2023-05-15 RX ORDER — POTASSIUM CHLORIDE 20 MEQ/1
20 TABLET, EXTENDED RELEASE ORAL EVERY OTHER DAY
COMMUNITY
Start: 2020-10-22

## 2023-05-15 RX ORDER — LACOSAMIDE 200 MG/1
200 TABLET ORAL 2 TIMES DAILY
COMMUNITY
Start: 2020-09-23

## 2023-05-15 RX ORDER — BLOOD PRESSURE TEST KIT
1 KIT MISCELLANEOUS DAILY
COMMUNITY
Start: 2020-10-22

## 2023-05-15 RX ORDER — ZONISAMIDE 100 MG/1
100 CAPSULE ORAL 2 TIMES DAILY
COMMUNITY
Start: 2020-06-26

## 2023-05-15 RX ORDER — LEVOTHYROXINE SODIUM 0.12 MG/1
125 TABLET ORAL DAILY
COMMUNITY
Start: 2020-05-19